# Patient Record
Sex: MALE | Race: WHITE | NOT HISPANIC OR LATINO | Employment: OTHER | URBAN - METROPOLITAN AREA
[De-identification: names, ages, dates, MRNs, and addresses within clinical notes are randomized per-mention and may not be internally consistent; named-entity substitution may affect disease eponyms.]

---

## 2023-03-07 PROBLEM — I10 ESSENTIAL HYPERTENSION: Status: ACTIVE | Noted: 2023-03-07

## 2023-03-07 PROBLEM — B34.9 VIRAL ILLNESS: Status: ACTIVE | Noted: 2023-03-07

## 2023-03-07 PROBLEM — I48.0 PAROXYSMAL ATRIAL FIBRILLATION (MULTI): Status: ACTIVE | Noted: 2023-03-07

## 2023-03-07 RX ORDER — METOPROLOL TARTRATE 25 MG/1
25 TABLET, FILM COATED ORAL 2 TIMES DAILY
COMMUNITY

## 2023-03-07 RX ORDER — MULTIVITAMIN
1 TABLET ORAL DAILY
COMMUNITY

## 2023-03-07 RX ORDER — FLECAINIDE ACETATE 50 MG/1
1.5 TABLET ORAL 2 TIMES DAILY
COMMUNITY

## 2023-03-08 ENCOUNTER — OFFICE VISIT (OUTPATIENT)
Dept: PRIMARY CARE | Facility: CLINIC | Age: 68
End: 2023-03-08
Payer: MEDICARE

## 2023-03-08 VITALS
HEIGHT: 70 IN | HEART RATE: 60 BPM | DIASTOLIC BLOOD PRESSURE: 84 MMHG | WEIGHT: 213.6 LBS | BODY MASS INDEX: 30.58 KG/M2 | TEMPERATURE: 97.8 F | SYSTOLIC BLOOD PRESSURE: 128 MMHG

## 2023-03-08 DIAGNOSIS — R05.1 ACUTE COUGH: ICD-10-CM

## 2023-03-08 DIAGNOSIS — J20.9 ACUTE BRONCHITIS, UNSPECIFIED ORGANISM: Primary | ICD-10-CM

## 2023-03-08 PROCEDURE — 1160F RVW MEDS BY RX/DR IN RCRD: CPT | Performed by: NURSE PRACTITIONER

## 2023-03-08 PROCEDURE — 1159F MED LIST DOCD IN RCRD: CPT | Performed by: NURSE PRACTITIONER

## 2023-03-08 PROCEDURE — 99212 OFFICE O/P EST SF 10 MIN: CPT | Performed by: NURSE PRACTITIONER

## 2023-03-08 PROCEDURE — 3074F SYST BP LT 130 MM HG: CPT | Performed by: NURSE PRACTITIONER

## 2023-03-08 PROCEDURE — 3079F DIAST BP 80-89 MM HG: CPT | Performed by: NURSE PRACTITIONER

## 2023-03-08 PROCEDURE — 1036F TOBACCO NON-USER: CPT | Performed by: NURSE PRACTITIONER

## 2023-03-08 RX ORDER — BETAMETHASONE DIPROPIONATE 0.5 MG/G
CREAM TOPICAL
COMMUNITY
Start: 2022-07-21

## 2023-03-08 RX ORDER — EPINEPHRINE 0.3 MG/.3ML
INJECTION SUBCUTANEOUS
COMMUNITY
Start: 2022-07-21

## 2023-03-08 RX ORDER — LORATADINE 10 MG/1
1 TABLET ORAL DAILY
COMMUNITY
Start: 2022-07-21

## 2023-03-08 RX ORDER — AZITHROMYCIN 250 MG/1
250 TABLET, FILM COATED ORAL DAILY
Qty: 5 TABLET | Refills: 0 | Status: SHIPPED | OUTPATIENT
Start: 2023-03-08 | End: 2023-03-13

## 2023-03-08 RX ORDER — ALBUTEROL SULFATE 90 UG/1
2 AEROSOL, METERED RESPIRATORY (INHALATION) EVERY 4 HOURS PRN
Qty: 18 G | Refills: 0 | Status: SHIPPED | OUTPATIENT
Start: 2023-03-08 | End: 2024-02-19 | Stop reason: ALTCHOICE

## 2023-03-08 RX ORDER — BENZONATATE 100 MG/1
100 CAPSULE ORAL 3 TIMES DAILY PRN
Qty: 20 CAPSULE | Refills: 0 | Status: SHIPPED | OUTPATIENT
Start: 2023-03-08 | End: 2023-03-15

## 2023-03-08 RX ORDER — BENZONATATE 200 MG/1
1 CAPSULE ORAL
COMMUNITY
Start: 2023-01-17 | End: 2023-03-08 | Stop reason: SDUPTHER

## 2023-03-08 RX ORDER — BENZONATATE 200 MG/1
200 CAPSULE ORAL 3 TIMES DAILY PRN
Qty: 20 CAPSULE | Refills: 0 | Status: SHIPPED | OUTPATIENT
Start: 2023-03-08 | End: 2023-03-15

## 2023-03-08 ASSESSMENT — ENCOUNTER SYMPTOMS
DIARRHEA: 0
CHILLS: 0
FEVER: 0
WHEEZING: 0
SORE THROAT: 0
STRIDOR: 0
CHEST TIGHTNESS: 0
SINUS PAIN: 0
ABDOMINAL PAIN: 0
PALPITATIONS: 0
COUGH: 1
CONSTIPATION: 0
VOMITING: 0
SINUS PRESSURE: 0
SHORTNESS OF BREATH: 1
RHINORRHEA: 0

## 2023-03-08 NOTE — PROGRESS NOTES
"Subjective   Patient ID: Herman Merino is a 68 y.o. male who presents for Cough (1 week).    HPI   68 year old male presents with persistent cough (occasional productive) for 1 week.   He has not taken a COVID test.  Patient reports that the cough is severe when it starts and can cause right sided chest and back pain.  He reports that during the coughing fits he also experiences some shortness of breath.  He denies fever, chills, nausea, vomiting, chest pain    Review of Systems   Constitutional:  Negative for chills and fever.   HENT:  Negative for postnasal drip, rhinorrhea, sinus pressure, sinus pain, sneezing and sore throat.    Respiratory:  Positive for cough and shortness of breath. Negative for chest tightness, wheezing and stridor.    Cardiovascular:  Negative for chest pain and palpitations.   Gastrointestinal:  Negative for abdominal pain, constipation, diarrhea and vomiting.       Objective   /84   Pulse 60   Temp 36.6 °C (97.8 °F) (Temporal)   Ht 1.778 m (5' 10\")   Wt 96.9 kg (213 lb 9.6 oz)   BMI 30.65 kg/m²   BSA Body surface area is 2.19 meters squared.      Physical Exam  Constitutional:       Appearance: Normal appearance.   Cardiovascular:      Rate and Rhythm: Normal rate and regular rhythm.   Pulmonary:      Effort: Pulmonary effort is normal. No respiratory distress.      Breath sounds: Rhonchi present.      Comments: Rhonchi noted in bilateral lower lobes  Abdominal:      General: Abdomen is flat.      Palpations: Abdomen is soft.   Neurological:      Mental Status: He is alert.       No results found for any previous visit.     Current Outpatient Medications on File Prior to Visit   Medication Sig Dispense Refill    apixaban (Eliquis) 5 mg tablet Take 1 tablet (5 mg) by mouth in the morning and 1 tablet (5 mg) before bedtime.      benzonatate (Tessalon) 200 mg capsule Take 1 capsule (200 mg) by mouth every 6 hours during the day.      betamethasone dipropionate 0.05 % cream " APPLY TOPICALLY TO AFFECTED AREAS twice a day for 30 DAYS      EPINEPHrine 0.3 mg/0.3 mL injection syringe use as directed by prescriber intramuscularly if needed ONCE for 1 day      flecainide (Tambocor) 50 mg tablet Take 1.5 tablets (75 mg) by mouth in the morning and 1.5 tablets (75 mg) before bedtime.      loratadine (Claritin) 10 mg tablet Take 1 tablet (10 mg) by mouth once daily.      metoprolol tartrate (Lopressor) 25 mg tablet Take 1 tablet (25 mg) by mouth in the morning and 1 tablet (25 mg) before bedtime.      multivitamin tablet Take 1 tablet by mouth once daily.       No current facility-administered medications on file prior to visit.     No images are attached to the encounter.            Assessment/Plan   Problem List Items Addressed This Visit          Respiratory    Acute bronchitis     - Antibiotic sent to pharmacy  - Albuterol inhaler sent to pharmacy   - Advised patient to push fluids   - patient to call if develops new or worsening symptoms              Relevant Medications    azithromycin (Zithromax) 250 mg tablet    albuterol 90 mcg/actuation inhaler    benzonatate (Tessalon) 100 mg capsule       Other    Acute cough - Primary     Patient presents with persistent cough x1 week and right sided chest pain radiating to his back  Positive crackles on examination  -Patient to obtain chest x-ray         Relevant Medications    benzonatate (Tessalon) 200 mg capsule    azithromycin (Zithromax) 250 mg tablet    albuterol 90 mcg/actuation inhaler    benzonatate (Tessalon) 100 mg capsule    Other Relevant Orders    XR chest 2 views

## 2023-03-08 NOTE — ASSESSMENT & PLAN NOTE
- Antibiotic sent to pharmacy  - Albuterol inhaler sent to pharmacy   - Advised patient to push fluids   - patient to call if develops new or worsening symptoms

## 2023-03-08 NOTE — PATIENT INSTRUCTIONS
Obtain chest x-ray as ordered  Start using azithromycin as prescribed  May use albuterol inhaler as needed  May use Tessalon Perles for cough as needed  Please call if your symptoms worsen or do not improve

## 2023-03-08 NOTE — ASSESSMENT & PLAN NOTE
Patient presents with persistent cough x1 week and right sided chest pain radiating to his back  Positive crackles on examination  -Patient to obtain chest x-ray

## 2023-05-22 ENCOUNTER — TELEPHONE (OUTPATIENT)
Dept: PRIMARY CARE | Facility: CLINIC | Age: 68
End: 2023-05-22
Payer: MEDICARE

## 2023-05-23 DIAGNOSIS — I10 ESSENTIAL HYPERTENSION: ICD-10-CM

## 2023-05-23 DIAGNOSIS — Z12.5 SCREENING PSA (PROSTATE SPECIFIC ANTIGEN): ICD-10-CM

## 2023-05-23 DIAGNOSIS — R73.9 ELEVATED SERUM GLUCOSE: ICD-10-CM

## 2023-06-19 DIAGNOSIS — B35.6 JOCK ITCH: ICD-10-CM

## 2023-06-19 RX ORDER — KETOCONAZOLE 20 MG/G
CREAM TOPICAL DAILY
COMMUNITY
End: 2023-06-19 | Stop reason: SDUPTHER

## 2023-06-19 RX ORDER — KETOCONAZOLE 20 MG/G
CREAM TOPICAL DAILY
Qty: 45 G | Refills: 1 | Status: SHIPPED | OUTPATIENT
Start: 2023-06-19 | End: 2024-05-21 | Stop reason: ALTCHOICE

## 2023-09-07 ENCOUNTER — OFFICE VISIT (OUTPATIENT)
Dept: PRIMARY CARE | Facility: CLINIC | Age: 68
End: 2023-09-07
Payer: MEDICARE

## 2023-09-07 VITALS
HEIGHT: 70 IN | TEMPERATURE: 97.4 F | HEART RATE: 58 BPM | OXYGEN SATURATION: 98 % | BODY MASS INDEX: 30.21 KG/M2 | WEIGHT: 211 LBS | SYSTOLIC BLOOD PRESSURE: 126 MMHG | DIASTOLIC BLOOD PRESSURE: 86 MMHG

## 2023-09-07 DIAGNOSIS — Z00.00 ENCOUNTER FOR ANNUAL WELLNESS VISIT (AWV) IN MEDICARE PATIENT: Primary | ICD-10-CM

## 2023-09-07 DIAGNOSIS — Z71.89 ADVANCE DIRECTIVE DISCUSSED WITH PATIENT: ICD-10-CM

## 2023-09-07 DIAGNOSIS — M25.572 LEFT ANKLE PAIN, UNSPECIFIED CHRONICITY: ICD-10-CM

## 2023-09-07 DIAGNOSIS — Z13.89 ENCOUNTER FOR SCREENING FOR OTHER DISORDER: ICD-10-CM

## 2023-09-07 DIAGNOSIS — Z00.00 ROUTINE GENERAL MEDICAL EXAMINATION AT HEALTH CARE FACILITY: ICD-10-CM

## 2023-09-07 DIAGNOSIS — K43.9 ABDOMINAL WALL HERNIA: ICD-10-CM

## 2023-09-07 DIAGNOSIS — I48.0 PAROXYSMAL ATRIAL FIBRILLATION (MULTI): ICD-10-CM

## 2023-09-07 DIAGNOSIS — Z12.11 SCREENING FOR COLON CANCER: ICD-10-CM

## 2023-09-07 DIAGNOSIS — I10 ESSENTIAL HYPERTENSION: ICD-10-CM

## 2023-09-07 DIAGNOSIS — Z28.21 VACCINATION NOT CARRIED OUT BECAUSE OF PATIENT REFUSAL: ICD-10-CM

## 2023-09-07 DIAGNOSIS — Z71.89 CARDIAC RISK COUNSELING: ICD-10-CM

## 2023-09-07 PROBLEM — R05.1 ACUTE COUGH: Status: RESOLVED | Noted: 2023-03-08 | Resolved: 2023-09-07

## 2023-09-07 PROBLEM — J20.9 ACUTE BRONCHITIS: Status: RESOLVED | Noted: 2023-03-08 | Resolved: 2023-09-07

## 2023-09-07 PROCEDURE — 3079F DIAST BP 80-89 MM HG: CPT | Performed by: FAMILY MEDICINE

## 2023-09-07 PROCEDURE — 99497 ADVNCD CARE PLAN 30 MIN: CPT | Performed by: FAMILY MEDICINE

## 2023-09-07 PROCEDURE — 99214 OFFICE O/P EST MOD 30 MIN: CPT | Performed by: FAMILY MEDICINE

## 2023-09-07 PROCEDURE — 3074F SYST BP LT 130 MM HG: CPT | Performed by: FAMILY MEDICINE

## 2023-09-07 PROCEDURE — 1170F FXNL STATUS ASSESSED: CPT | Performed by: FAMILY MEDICINE

## 2023-09-07 PROCEDURE — G0444 DEPRESSION SCREEN ANNUAL: HCPCS | Performed by: FAMILY MEDICINE

## 2023-09-07 PROCEDURE — 1036F TOBACCO NON-USER: CPT | Performed by: FAMILY MEDICINE

## 2023-09-07 PROCEDURE — 1160F RVW MEDS BY RX/DR IN RCRD: CPT | Performed by: FAMILY MEDICINE

## 2023-09-07 PROCEDURE — 99397 PER PM REEVAL EST PAT 65+ YR: CPT | Performed by: FAMILY MEDICINE

## 2023-09-07 PROCEDURE — G0439 PPPS, SUBSEQ VISIT: HCPCS | Performed by: FAMILY MEDICINE

## 2023-09-07 PROCEDURE — 1159F MED LIST DOCD IN RCRD: CPT | Performed by: FAMILY MEDICINE

## 2023-09-07 PROCEDURE — G0446 INTENS BEHAVE THER CARDIO DX: HCPCS | Performed by: FAMILY MEDICINE

## 2023-09-07 ASSESSMENT — ACTIVITIES OF DAILY LIVING (ADL)
GROCERY_SHOPPING: INDEPENDENT
MANAGING_FINANCES: INDEPENDENT
DRESSING: INDEPENDENT
TAKING_MEDICATION: INDEPENDENT
DOING_HOUSEWORK: INDEPENDENT
BATHING: INDEPENDENT

## 2023-09-07 ASSESSMENT — ENCOUNTER SYMPTOMS
FLANK PAIN: 0
CONSTIPATION: 0
APPETITE CHANGE: 0
BACK PAIN: 0
GASTROINTESTINAL NEGATIVE: 1
CARDIOVASCULAR NEGATIVE: 1
SORE THROAT: 0
DECREASED CONCENTRATION: 0
LOSS OF SENSATION IN FEET: 0
LIGHT-HEADEDNESS: 0
PALPITATIONS: 0
FACIAL ASYMMETRY: 0
SEIZURES: 0
ADENOPATHY: 0
DIAPHORESIS: 0
HEADACHES: 0
EYE ITCHING: 0
CHILLS: 0
EYE REDNESS: 0
SINUS PRESSURE: 0
SINUS PAIN: 0
NERVOUS/ANXIOUS: 0
WOUND: 0
DIZZINESS: 0
UNEXPECTED WEIGHT CHANGE: 0
TROUBLE SWALLOWING: 0
DIARRHEA: 0
FREQUENCY: 0
DEPRESSION: 0
ABDOMINAL DISTENTION: 0
VOICE CHANGE: 0
COUGH: 0
DYSURIA: 0
NAUSEA: 0
SPEECH DIFFICULTY: 0
FATIGUE: 0
CHEST TIGHTNESS: 0
DIFFICULTY URINATING: 0
SLEEP DISTURBANCE: 0
VOMITING: 0
ABDOMINAL PAIN: 0
CHOKING: 0
EYE DISCHARGE: 0
POLYDIPSIA: 0
AGITATION: 0
ANAL BLEEDING: 0
APNEA: 0
WHEEZING: 0
EYE PAIN: 0
NUMBNESS: 0
BLOOD IN STOOL: 0
MYALGIAS: 0
STRIDOR: 0
ARTHRALGIAS: 1
OCCASIONAL FEELINGS OF UNSTEADINESS: 0
BRUISES/BLEEDS EASILY: 0

## 2023-09-07 ASSESSMENT — PATIENT HEALTH QUESTIONNAIRE - PHQ9
SUM OF ALL RESPONSES TO PHQ9 QUESTIONS 1 AND 2: 0
10. IF YOU CHECKED OFF ANY PROBLEMS, HOW DIFFICULT HAVE THESE PROBLEMS MADE IT FOR YOU TO DO YOUR WORK, TAKE CARE OF THINGS AT HOME, OR GET ALONG WITH OTHER PEOPLE: NOT DIFFICULT AT ALL
1. LITTLE INTEREST OR PLEASURE IN DOING THINGS: NOT AT ALL
2. FEELING DOWN, DEPRESSED OR HOPELESS: NOT AT ALL

## 2023-09-07 NOTE — PROGRESS NOTES
Advance Care Planning Note     Discussion Date: 09/07/23   Discussion Participants: patient    The patient wishes to discuss Advance Care Planning today and the following is a brief summary of our discussion.     Patient has capacity to make their own medical decisions: Yes  Health Care Agent/Surrogate Decision Maker documented in chart: No    Documents on file and valid:  Advance Directive/Living Will: Yes   Health Care Power of : Yes  Other: no DNR. Full code status.    Communication of Medical Status/Prognosis:   good     Communication of Treatment Goals/Options:   good     Treatment Decisions  good    Time Statement: Total face to face time spent on advance care planning was 16 minutes with 16 minutes spent in counseling, including the explanation.    Usama Girard, DOS[employer]   Patient ID: Herman Merino is a 68 y.o. male who presents for Medicare Annual Wellness Visit Subsequent.    Saw cardiology and dermatologist.  Overall is been feeling well.    Patient's had some left ankle pain and discomfort.  He has been following up with orthopedic specialist going to do physical therapy for this.  He has noted some asymmetry of the abdominal wall there is been no nausea vomiting no pain no discomfort.  He is also due to have his colon cancer screening performed and is agreeing to doing so.    Continue to follow-up with cardiology regarding atrial fibrillation.  This has been stable.  From time to time he states he is somewhat tired when he has the episodes of the A-fib but he does not really feel it he said no chest pain or shortness of breath he said no nausea no vomiting no abdominal pain or discomfort he has had no blood in the stool or black tarry stool no numbness no tingling legs or feet he does see dermatology regularly but is had some tenderness over the lateral aspect of the left ankle.    Saw derm.    Saw cadio    Saw ortho.    Intrmittant A fib.      Pain into the L foot and ankle      "    Alcohol intake: none  Caffeine intake: 2 cups of coffee  Exercise: not much with foot pain    Last Colonoscopy: ordered  Last Pap smear: N/A  Mammogram:N/A  Last Dexa scan:N/A    Shingles vaccine: ordered  TdaP vaccine:     Review of Systems   Constitutional:  Negative for appetite change, chills, diaphoresis, fatigue and unexpected weight change.   HENT:  Negative for congestion, dental problem, ear discharge, ear pain, hearing loss, mouth sores, nosebleeds, postnasal drip, sinus pressure, sinus pain, sore throat, tinnitus, trouble swallowing and voice change.    Eyes:  Negative for pain, discharge, redness, itching and visual disturbance.   Respiratory:  Negative for apnea, cough, choking, chest tightness, wheezing and stridor.    Cardiovascular: Negative.  Negative for chest pain, palpitations and leg swelling.   Gastrointestinal: Negative.  Negative for abdominal distention, abdominal pain, anal bleeding, blood in stool, constipation, diarrhea, nausea and vomiting.   Endocrine: Negative for cold intolerance, heat intolerance, polydipsia and polyuria.   Genitourinary:  Positive for enuresis (1 time). Negative for difficulty urinating, dysuria, flank pain and frequency.   Musculoskeletal:  Positive for arthralgias. Negative for back pain and myalgias.        Ankle pain   Skin:  Negative for rash and wound.   Allergic/Immunologic: Negative for environmental allergies, food allergies and immunocompromised state.   Neurological:  Negative for dizziness, seizures, facial asymmetry, speech difficulty, light-headedness, numbness and headaches.   Hematological:  Negative for adenopathy. Does not bruise/bleed easily.   Psychiatric/Behavioral:  Negative for agitation, behavioral problems, decreased concentration, sleep disturbance and suicidal ideas. The patient is not nervous/anxious.        Objective   /86   Pulse 58   Temp 36.3 °C (97.4 °F)   Ht 1.765 m (5' 9.5\")   Wt 95.7 kg (211 lb)   SpO2 98%   BMI " 30.71 kg/m²   BSA Body surface area is 2.17 meters squared.      Physical Exam  Constitutional:       General: He is not in acute distress.     Appearance: Normal appearance. He is not ill-appearing or toxic-appearing.   HENT:      Head: Normocephalic.      Right Ear: Tympanic membrane normal.      Left Ear: Tympanic membrane normal.      Nose: Nose normal.   Eyes:      Extraocular Movements: Extraocular movements intact.      Conjunctiva/sclera: Conjunctivae normal.      Pupils: Pupils are equal, round, and reactive to light.   Cardiovascular:      Rate and Rhythm: Normal rate and regular rhythm.      Pulses: Normal pulses.      Heart sounds: Normal heart sounds.   Pulmonary:      Effort: Pulmonary effort is normal.      Breath sounds: Normal breath sounds. No wheezing or rhonchi.   Abdominal:      General: Abdomen is flat. Bowel sounds are normal. There is no distension.      Palpations: Abdomen is soft.      Tenderness: There is no abdominal tenderness. There is no right CVA tenderness or rebound.      Hernia: No hernia is present.      Comments: Abdominal wall hernia noted.  Easily reducible.  Noted with standing in the left upper abdominal wall area   Genitourinary:     Penis: Normal.    Musculoskeletal:         General: Normal range of motion.      Cervical back: Normal range of motion.      Right lower leg: No edema.      Comments: Slight tenderness noted lateral aspect of the left ankle.  Anterior posterior drawer sign intact.    Dorsiflexion extension intact.  No redness no warmth   Skin:     General: Skin is warm and dry.      Capillary Refill: Capillary refill takes less than 2 seconds.      Findings: No bruising.   Neurological:      General: No focal deficit present.      Mental Status: He is alert and oriented to person, place, and time.      Cranial Nerves: No cranial nerve deficit.      Sensory: No sensory deficit.      Motor: No weakness.      Coordination: Coordination normal.      Gait: Gait  normal.      Deep Tendon Reflexes: Reflexes normal.   Psychiatric:         Mood and Affect: Mood normal.         Behavior: Behavior normal.       No results found for any previous visit.     Current Outpatient Medications on File Prior to Visit   Medication Sig Dispense Refill    apixaban (Eliquis) 5 mg tablet Take 1 tablet (5 mg) by mouth 2 times a day.      betamethasone dipropionate 0.05 % cream APPLY TOPICALLY TO AFFECTED AREAS twice a day for 30 DAYS      EPINEPHrine 0.3 mg/0.3 mL injection syringe use as directed by prescriber intramuscularly if needed ONCE for 1 day      flecainide (Tambocor) 50 mg tablet Take 1.5 tablets (75 mg) by mouth 2 times a day.      ketoconazole (NIZOral) 2 % cream Apply topically once daily. 45 g 1    loratadine (Claritin) 10 mg tablet Take 1 tablet (10 mg) by mouth once daily.      metoprolol tartrate (Lopressor) 25 mg tablet Take 1 tablet (25 mg) by mouth 2 times a day.      multivitamin tablet Take 1 tablet by mouth once daily.      albuterol 90 mcg/actuation inhaler Inhale 2 puffs every 4 hours if needed for wheezing or shortness of breath. 18 g 0     No current facility-administered medications on file prior to visit.     No images are attached to the encounter.            Assessment/Plan   Problem List Items Addressed This Visit       Essential hypertension     Blood pressure under good control at this time         Paroxysmal atrial fibrillation (CMS/HCC)     Please continue with anticoagulation and with Tambocor therapy please continue to follow-up with cardiology         Vaccination not carried out because of patient refusal     I have recommended doing Prevnar 20 information given today.  I recommended doing shingles vaccination information given today.         Encounter for annual wellness visit (AWV) in Medicare patient - Primary    Abdominal wall hernia     Would like for you to see the surgeon for evaluation of abdominal wall hernia.  If any pain or discomfort in this  area noted please let me know          Other Visit Diagnoses       Routine general medical examination at health care facility        Left ankle pain, unspecified chronicity        Encounter for screening for other disorder [Z13.89]        Advance directive discussed with patient [Z71.89]        Cardiac risk counseling [Z71.89]

## 2023-09-07 NOTE — PATIENT INSTRUCTIONS
Please have your lab studies performed would like to evaluate all aspects of your health with this.    You are due to have your colon cancer screening performed going to recommend GI specialist to do this recommend seeing Dr. Hazel or partner.    Recommended shingles vaccination and Prevnar 20.    Going to have you see surgery specialist because of abdominal wall hernia.  If any nausea vomiting or abdominal pain noted please call and let me know.    Please continue to follow-up with orthopedics regarding left ankle pain

## 2023-09-07 NOTE — ASSESSMENT & PLAN NOTE
Would like for you to see the surgeon for evaluation of abdominal wall hernia.  If any pain or discomfort in this area noted please let me know

## 2023-09-07 NOTE — ASSESSMENT & PLAN NOTE
Please continue with anticoagulation and with Tambocor therapy please continue to follow-up with cardiology

## 2023-09-07 NOTE — ASSESSMENT & PLAN NOTE
I have recommended doing Prevnar 20 information given today.  I recommended doing shingles vaccination information given today.

## 2023-09-20 ENCOUNTER — LAB (OUTPATIENT)
Dept: LAB | Facility: LAB | Age: 68
End: 2023-09-20
Payer: MEDICARE

## 2023-09-20 DIAGNOSIS — R73.9 ELEVATED SERUM GLUCOSE: ICD-10-CM

## 2023-09-20 DIAGNOSIS — I10 ESSENTIAL HYPERTENSION: ICD-10-CM

## 2023-09-20 DIAGNOSIS — Z12.5 SCREENING PSA (PROSTATE SPECIFIC ANTIGEN): ICD-10-CM

## 2023-09-20 LAB
ALANINE AMINOTRANSFERASE (SGPT) (U/L) IN SER/PLAS: 35 U/L (ref 10–52)
ALBUMIN (G/DL) IN SER/PLAS: 4.4 G/DL (ref 3.4–5)
ALBUMIN (G/DL) IN SER/PLAS: 4.4 G/DL (ref 3.4–5)
ALKALINE PHOSPHATASE (U/L) IN SER/PLAS: 57 U/L (ref 33–136)
ANION GAP IN SER/PLAS: 12 MMOL/L (ref 10–20)
ANION GAP IN SER/PLAS: 17 MMOL/L (ref 10–20)
ASPARTATE AMINOTRANSFERASE (SGOT) (U/L) IN SER/PLAS: 23 U/L (ref 9–39)
BASOPHILS (10*3/UL) IN BLOOD BY AUTOMATED COUNT: 0.06 X10E9/L (ref 0–0.1)
BASOPHILS/100 LEUKOCYTES IN BLOOD BY AUTOMATED COUNT: 0.6 % (ref 0–2)
BILIRUBIN TOTAL (MG/DL) IN SER/PLAS: 0.8 MG/DL (ref 0–1.2)
CALCIUM (MG/DL) IN SER/PLAS: 10.3 MG/DL (ref 8.6–10.6)
CALCIUM (MG/DL) IN SER/PLAS: 10.4 MG/DL (ref 8.6–10.6)
CARBON DIOXIDE, TOTAL (MMOL/L) IN SER/PLAS: 27 MMOL/L (ref 21–32)
CARBON DIOXIDE, TOTAL (MMOL/L) IN SER/PLAS: 29 MMOL/L (ref 21–32)
CHLORIDE (MMOL/L) IN SER/PLAS: 102 MMOL/L (ref 98–107)
CHLORIDE (MMOL/L) IN SER/PLAS: 99 MMOL/L (ref 98–107)
CHOLESTEROL (MG/DL) IN SER/PLAS: 164 MG/DL (ref 0–199)
CHOLESTEROL IN HDL (MG/DL) IN SER/PLAS: 42.1 MG/DL
CHOLESTEROL/HDL RATIO: 3.9
CREATININE (MG/DL) IN SER/PLAS: 1.17 MG/DL (ref 0.5–1.3)
CREATININE (MG/DL) IN SER/PLAS: 1.17 MG/DL (ref 0.5–1.3)
EOSINOPHILS (10*3/UL) IN BLOOD BY AUTOMATED COUNT: 0.5 X10E9/L (ref 0–0.7)
EOSINOPHILS/100 LEUKOCYTES IN BLOOD BY AUTOMATED COUNT: 4.9 % (ref 0–6)
ERYTHROCYTE DISTRIBUTION WIDTH (RATIO) BY AUTOMATED COUNT: 13.2 % (ref 11.5–14.5)
ERYTHROCYTE DISTRIBUTION WIDTH (RATIO) BY AUTOMATED COUNT: 13.3 % (ref 11.5–14.5)
ERYTHROCYTE MEAN CORPUSCULAR HEMOGLOBIN CONCENTRATION (G/DL) BY AUTOMATED: 32.3 G/DL (ref 32–36)
ERYTHROCYTE MEAN CORPUSCULAR HEMOGLOBIN CONCENTRATION (G/DL) BY AUTOMATED: 32.7 G/DL (ref 32–36)
ERYTHROCYTE MEAN CORPUSCULAR VOLUME (FL) BY AUTOMATED COUNT: 89 FL (ref 80–100)
ERYTHROCYTE MEAN CORPUSCULAR VOLUME (FL) BY AUTOMATED COUNT: 91 FL (ref 80–100)
ERYTHROCYTES (10*6/UL) IN BLOOD BY AUTOMATED COUNT: 5.28 X10E12/L (ref 4.5–5.9)
ERYTHROCYTES (10*6/UL) IN BLOOD BY AUTOMATED COUNT: 5.34 X10E12/L (ref 4.5–5.9)
ESTIMATED AVERAGE GLUCOSE FOR HBA1C: 111 MG/DL
GFR MALE: 69 ML/MIN/1.73M2
GFR MALE: 69 ML/MIN/1.73M2
GLUCOSE (MG/DL) IN SER/PLAS: 93 MG/DL (ref 74–99)
GLUCOSE (MG/DL) IN SER/PLAS: 98 MG/DL (ref 74–99)
HEMATOCRIT (%) IN BLOOD BY AUTOMATED COUNT: 47.7 % (ref 41–52)
HEMATOCRIT (%) IN BLOOD BY AUTOMATED COUNT: 48.3 % (ref 41–52)
HEMOGLOBIN (G/DL) IN BLOOD: 15.6 G/DL (ref 13.5–17.5)
HEMOGLOBIN (G/DL) IN BLOOD: 15.6 G/DL (ref 13.5–17.5)
HEMOGLOBIN A1C/HEMOGLOBIN TOTAL IN BLOOD: 5.5 %
IMMATURE GRANULOCYTES/100 LEUKOCYTES IN BLOOD BY AUTOMATED COUNT: 0.2 % (ref 0–0.9)
LDL: 86 MG/DL (ref 0–99)
LEUKOCYTES (10*3/UL) IN BLOOD BY AUTOMATED COUNT: 10 X10E9/L (ref 4.4–11.3)
LEUKOCYTES (10*3/UL) IN BLOOD BY AUTOMATED COUNT: 10.1 X10E9/L (ref 4.4–11.3)
LYMPHOCYTES (10*3/UL) IN BLOOD BY AUTOMATED COUNT: 2.67 X10E9/L (ref 1.2–4.8)
LYMPHOCYTES/100 LEUKOCYTES IN BLOOD BY AUTOMATED COUNT: 26.4 % (ref 13–44)
MONOCYTES (10*3/UL) IN BLOOD BY AUTOMATED COUNT: 0.98 X10E9/L (ref 0.1–1)
MONOCYTES/100 LEUKOCYTES IN BLOOD BY AUTOMATED COUNT: 9.7 % (ref 2–10)
NEUTROPHILS (10*3/UL) IN BLOOD BY AUTOMATED COUNT: 5.9 X10E9/L (ref 1.2–7.7)
NEUTROPHILS/100 LEUKOCYTES IN BLOOD BY AUTOMATED COUNT: 58.2 % (ref 40–80)
NRBC (PER 100 WBCS) BY AUTOMATED COUNT: 0 /100 WBC (ref 0–0)
NRBC (PER 100 WBCS) BY AUTOMATED COUNT: 0 /100 WBC (ref 0–0)
PHOSPHATE (MG/DL) IN SER/PLAS: 3.5 MG/DL (ref 2.5–4.9)
PLATELETS (10*3/UL) IN BLOOD AUTOMATED COUNT: 283 X10E9/L (ref 150–450)
PLATELETS (10*3/UL) IN BLOOD AUTOMATED COUNT: 295 X10E9/L (ref 150–450)
POTASSIUM (MMOL/L) IN SER/PLAS: 4.8 MMOL/L (ref 3.5–5.3)
POTASSIUM (MMOL/L) IN SER/PLAS: 4.8 MMOL/L (ref 3.5–5.3)
PROSTATE SPECIFIC ANTIGEN,SCREEN: 0.61 NG/ML (ref 0–4)
PROTEIN TOTAL: 7.6 G/DL (ref 6.4–8.2)
SODIUM (MMOL/L) IN SER/PLAS: 138 MMOL/L (ref 136–145)
SODIUM (MMOL/L) IN SER/PLAS: 138 MMOL/L (ref 136–145)
THYROTROPIN (MIU/L) IN SER/PLAS BY DETECTION LIMIT <= 0.05 MIU/L: 1.52 MIU/L (ref 0.44–3.98)
TRIGLYCERIDE (MG/DL) IN SER/PLAS: 178 MG/DL (ref 0–149)
UREA NITROGEN (MG/DL) IN SER/PLAS: 16 MG/DL (ref 6–23)
UREA NITROGEN (MG/DL) IN SER/PLAS: 17 MG/DL (ref 6–23)
VLDL: 36 MG/DL (ref 0–40)

## 2023-09-20 PROCEDURE — G0103 PSA SCREENING: HCPCS

## 2023-09-20 PROCEDURE — 80053 COMPREHEN METABOLIC PANEL: CPT

## 2023-09-20 PROCEDURE — 80061 LIPID PANEL: CPT

## 2023-09-20 PROCEDURE — 84443 ASSAY THYROID STIM HORMONE: CPT

## 2023-09-20 PROCEDURE — 83036 HEMOGLOBIN GLYCOSYLATED A1C: CPT

## 2023-09-20 PROCEDURE — 36415 COLL VENOUS BLD VENIPUNCTURE: CPT

## 2023-09-20 PROCEDURE — 85025 COMPLETE CBC W/AUTO DIFF WBC: CPT

## 2023-10-03 ENCOUNTER — PREP FOR PROCEDURE (OUTPATIENT)
Dept: SURGERY | Facility: HOSPITAL | Age: 68
End: 2023-10-03

## 2023-10-03 ENCOUNTER — OFFICE VISIT (OUTPATIENT)
Dept: SURGERY | Facility: CLINIC | Age: 68
End: 2023-10-03
Payer: MEDICARE

## 2023-10-03 ENCOUNTER — APPOINTMENT (OUTPATIENT)
Dept: SURGERY | Facility: CLINIC | Age: 68
End: 2023-10-03
Payer: MEDICARE

## 2023-10-03 VITALS
WEIGHT: 215 LBS | BODY MASS INDEX: 31.29 KG/M2 | DIASTOLIC BLOOD PRESSURE: 77 MMHG | HEART RATE: 66 BPM | SYSTOLIC BLOOD PRESSURE: 119 MMHG

## 2023-10-03 DIAGNOSIS — K43.2 INCISIONAL HERNIA, WITHOUT OBSTRUCTION OR GANGRENE: Primary | ICD-10-CM

## 2023-10-03 DIAGNOSIS — Z01.818 PREOP TESTING: ICD-10-CM

## 2023-10-03 PROCEDURE — 1159F MED LIST DOCD IN RCRD: CPT | Performed by: SURGERY

## 2023-10-03 PROCEDURE — 3074F SYST BP LT 130 MM HG: CPT | Performed by: SURGERY

## 2023-10-03 PROCEDURE — 1160F RVW MEDS BY RX/DR IN RCRD: CPT | Performed by: SURGERY

## 2023-10-03 PROCEDURE — 99215 OFFICE O/P EST HI 40 MIN: CPT | Performed by: SURGERY

## 2023-10-03 PROCEDURE — 1036F TOBACCO NON-USER: CPT | Performed by: SURGERY

## 2023-10-03 PROCEDURE — 3078F DIAST BP <80 MM HG: CPT | Performed by: SURGERY

## 2023-10-03 NOTE — H&P
History Of Present Illness  eHrman Merino is a 68 y.o. male who presents for follow-up with regard to his incisional hernia following CAT scan imaging.  The patient was seen initially by me on 9/19/2023 at my [facility] office.  Please see that initial office visit in  legacy [employer].  At that time, the patient was diagnosed with likely an incisional hernia from his previous laparoscopic operation.  The patient underwent a robotic-assisted, laparoscopic repair of bilateral inguinal hernias in [facility] 3 to 5 years ago.  On examination at that time, the patient was noted to have a bulge in the epigastrium adjacent to his laparoscopic scar.    CT imaging was performed of the abdomen and pelvis with IV contrast on 9/25/2023.  I personally reviewed the report and the images.  This reveals evidence of a supraumbilical midline fat-containing hernia with edema and stranding of the herniated fat.  Specifically, there is a 2 cm transverse by 1.4 cm sagittal fascial defect with a hernia sac measuring 4 x 2.4 x 3.8 containing fat.  Incidental findings include fatty infiltration of the liver colonic diverticulosis and BPH.    Of note, the patient does have paroxysmal atrial fibrillation and is on Eliquis 5 mg p.o. twice daily.  He follows with Dr. Aric Livingston from cardiology.  He also has a history of hepatitis C virus as well in addition to hypertension.    Patient lives in [place].  He is self-employed and does industrial equipment repair.    Past Medical History  He has no past medical history on file.    Surgical History  He has a past surgical history that includes Skin cancer excision and Hernia repair.     Social History  He reports that he has quit smoking. He has never been exposed to tobacco smoke. He has never used smokeless tobacco. He reports that he does not drink alcohol and does not use drugs.    Family History  Family History   Problem Relation Name Age of Onset    Diabetes Mother      Other  (cardiac disorder) Father          Allergies  Bee venom protein (honey bee)    Review of Systems  Review of Systems    General: Not Present- Obesity, Cancer, HIV, MRSA, Recent Cold/Flu, Tired During the Day and VRE.  HEENT: Not Present- Migraine, Cataracts, Glaucoma, Macular Degeneration and Retinal Detachment.  Respiratory:Not Present- Asthma, Chronic Cough, Difficulty Breathing on Exertion, Difficulty Breathing at Rest, Emphysema, Frequent Bronchitis, Home CPAP/BiPAP, Home Oxygen, Pulmonary Embolus, Pneumonia/TB, Sleep Apnea and Snoring.  Cardiovascular: Not Present- Chest Pain, Congestive Heart Failure, Heart Attack, Coronary Artery Disease, Heart Stent, High Cholesterol/Lipids, Internal Defibrillator, Mitral Valve Prolapse, Murmur, Pacemaker and Peripheral Vascular Disease.  Gastrointestinal: Not Present- Heartburn, Hepatitis, Hiatal Hernia, Jaundice, Reflux, Stomach Ulcer and IBS.  Male Genitourinary: Not Present- Enlarged Prostate, Kidney Failure, Kidney Stones, Dialysis and Urinary Tract Infection.  Musculoskeletal: Not Present- Arthritis, Back Pain and Fibromyalgia.  Neurological: Not Present- Headaches, Numbness, Tingling, Seizures, Stroke,  Shunt and Weakness.  Psychiatric: Not Present- Anxiety, Bipolar, Depression and Panic Attacks.  Endocrine: Not Present- Diabetes, Hyperthyroidism, Hypothyroidism and Low Blood Sugar.  Hematology: Not Present- Abnormal Bleeding, Anemia and Blood Clots.     Physical Exam  General Complete Physical Exam    The physical exam findings are as follows:    General Appearance - Cooperative and Well groomed. Not in acute distress. Build & Nutrition - Well nourished.  Posture - Normal posture. Gait - Normal. Hydration - Well hydrated.    Integumentary  General Characteristics: Overall examination of the patient's skin reveals - no rashes, no suspicious lesions, no bruises and no evidence of scars. Color - normal coloration of skin. Skin Moisture - normal skin moisture.  Temperature - normal  warmth is noted. Texture - normal skin texture.    Head and Neck  Head - normocephalic, atraumatic with no lesions or palpable masses.  Neck  Global Assessment - full range of motion. no bruit auscultated on the right, no bruit auscultated on the left, non-tender, no lymphadenopathy, no palpable mass on the right and no palpable mass on the left.  Trachea - midline.  Thyroid Gland Characteristics - no palpable nodules, normal position, symmetric and smooth.    Eyes  Sclera/Conjunctiva - Bilateral - Normal. Pupil - Bilateral - Accommodating, Direct reaction to light normal and Equal.    ENMT  Global Assessment  Upon examination of the ears, nose, mouth and throat - examination of the oral cavity reveals normal lips, teeth, gums and oral mucosa and hard and soft palates, tongue, tonsils and posterior pharynx are moist and symmetric without lesions.    Chest and Lung Exam  Inspection: Shape - Symmetric. Movements - Symmetrical. Accessory muscles - No use of accessory muscles in breathing.  Percussion:  Quality and Intensity: - Percussion normal.  Palpation: - Palpation normal.  Auscultation:  Breath sounds: - Normal and equal bilaterally.  Adventitious sounds: - none bilaterally.    Cardiovascular  Inspection: Carotid artery - Bilateral - Inspection Normal.  Palpation/Percussion: Examination by palpation and percussion reveals - No Thrills.  Cardiac Borders - Normal.  Auscultation: Rhythm - Regular. Rate: Regular.  Heart Sounds - Normal heart sounds, S1 WNL and S2 WNL.  Murmurs & Other Heart Sounds: Auscultation of the heart reveals - No Murmurs or other extra heart sounds.    Abdomen  Inspection: Inspection of the abdomen reveals -there is a 2 cm sagittal scar, 5 cm superior to the umbilicus in the midline; just superior and left lateral to the scar, there is a 4 x 4 centimeter diffuse bulging when standing, and this is less apparent when supine; a clear fascial defect cannot be palpated on  examination  Palpation/Percussion: Palpation and Percussion of the abdomen reveal - Non Tender and No Palpable abdominal  masses.  Liver: - Normal.  Spleen: - Normal.  Auscultation: Auscultation of the abdomen reveals - Bowel sounds normal.  Surgical scars: none    Inguinal  No inguinal or femoral hernias or lymphadenopathy bilaterally.    Rectal - Did not examine.    Peripheral Vascular  Lower Extremity: Inspection - Bilateral - No Varicose veins.  Palpation: Edema - Bilateral - No edema.    Musculoskeletal  Global Assessment  Right Upper Extremity - no deformities, masses or tenderness, no known fractures, normal strength and tone and  normal range of motion without pain. Left Upper Extremity - no deformities, masses or tenderness, no known fractures,  normal strength and tone and normal range of motion without pain. Right Lower Extremity - no deformities, masses or  tenderness, no known fractures, normal strength and tone and normal range of motion without pain. Left Lower  Extremity - no deformities, masses or tenderness, no known fractures, normal strength and tone and normal range of  motion without pain.    Lymphatic  Head & Neck  General Head & Neck Lymphatics:  Bilateral: Description - Normal.  Axillary  General Axillary Region:  Bilateral: Description - Normal.  Femoral & Inguinal  Generalized Femoral & Inguinal Lymphatics:  Bilateral: Description - Normal.     Last Recorded Vitals  Blood pressure 119/77, pulse 66, weight 97.5 kg (215 lb).    Relevant Results    Home Meds:  Current Outpatient Medications   Medication Instructions    albuterol 90 mcg/actuation inhaler 2 puffs, inhalation, Every 4 hours PRN    apixaban (ELIQUIS) 5 mg, oral, 2 times daily    betamethasone dipropionate 0.05 % cream APPLY TOPICALLY TO AFFECTED AREAS twice a day for 30 DAYS    EPINEPHrine 0.3 mg/0.3 mL injection syringe use as directed by prescriber intramuscularly if needed ONCE for 1 day    flecainide (Tambocor) 50 mg tablet  1.5 tablets, oral, 2 times daily    ketoconazole (NIZOral) 2 % cream Topical, Daily    loratadine (Claritin) 10 mg tablet 1 tablet, oral, Daily    metoprolol tartrate (LOPRESSOR) 25 mg, oral, 2 times daily    multivitamin tablet 1 tablet, oral, Daily        Recent Lab Results:  No results found for this or any previous visit (from the past 24 hour(s)).    Recent Imaging Results:  CT abdomen pelvis w IV contrast    Result Date: 9/25/2023  Interpreted By:  GRETA CROSS MD MRN: [id number] Patient Name: LETA DE SANTIAGO  STUDY: CT ABDOMEN AND PELVIS W IV CONTRAST;  9/25/2023 1:46 pm  INDICATION: Rule out incisional hernia epigastrium  K43.2: Incisional hernia.  COMPARISON: None available.  ACCESSION NUMBER(S): [id number]  ORDERING CLINICIAN: TERELL MACK  TECHNIQUE: Contiguous axial images were obtained through the abdomen and pelvis after the administration of  75 mL Omnipaque 350 intravenous contrast. Coronal and sagittal reformations were made.  FINDINGS: LOWER CHEST: There is respiratory motion artifact with mild bibasilar atelectasis/scarring.  ABDOMEN:  LIVER: There is diffuse fatty infiltration of the liver.  BILE DUCTS: Nondilated.  GALLBLADDER: The gallbladder is not distended and without calcified stones.  PANCREAS: There is pancreatic parenchymal atrophy with insinuating fat throughout the gland.  SPLEEN: Within normal limits.  ADRENAL GLANDS: Within normal limits.  KIDNEYS AND URETERS: The kidneys enhance symmetrically without focal lesion.  No hydroureteronephrosis bilaterally.  Urinary bladder is partially distended. Prostate is enlarged containing irregular dystrophic calcifications.  VESSELS: Irregular atherosclerotic calcifications present in the aorta and branch vessels. No aortic aneurysm. IVC is unremarkable.  BOWEL: No bowel obstruction. Appendix is normal.  Few scattered small colonic diverticula present without acute diverticulitis.  PERITONEUM/RETROPERITONEUM/LYMPH NODES: No ascites or free air,  no fluid collection.  No retroperitoneal fluid collection or lymphadenopathy.  ABDOMINAL WALL: There is a supraumbilical midline mildly lobulated fat containing ventral hernia which measures approximately 4 x 2.4 x 3.8 cm in transverse, AP, and CC dimensions respectively. The hernia neck measures 2 x 1.4 cm in transverse and CC dimensions respectively. There is mild edema/fat stranding of the herniated fat. No bowel extends into this hernia sac.  BONE AND SOFT TISSUE: Mild multilevel disc space narrowing and predominantly anterior endplate spurring is present in the spine. Lower lumbar facet arthrosis is present. There is some joint space narrowing and marginal spurring of both hips.      Supraumbilical midline mildly lobulated fat containing hernia with mild edema/fat stranding of the herniated fat.  Diffuse fatty infiltration of the liver.  Normal appendix. No bowel obstruction.  Mild colonic diverticulosis without acute diverticulitis.  Enlarged prostate.  MACRO: None.      Assessment/Plan   Impression:  Mr. Merino has a reducible symptomatic incisional hernia.    Recommendation:    In order to relieve symptoms, and prevent complications such as incarceration, repair is indicated and recommended.    We will proceed with this in the near future, likely Thursday, 10/26/2023, under general anesthesia as an outpatient at [facility].  This will be a laparoscopic repair, possible open, with mesh (CPT 53596).    Preoperative CBC and renal profile were satisfactory.  EKG will be obtained preoperatively.    The patient will require preoperative cardiac clearance from Dr. Aric Livingston his cardiologist.  He will also need to stop his Eliquis 2 days preoperatively.    Patient will see me for a postoperative visit at my [facility] office on Tuesday, 11/7/2023.    Incisional/Ventral/Umbilical Hernia Consent:  The procedure was explained to the patient in detail, including the risks, benefits and alternatives. The risks include, but  are not limited to, infection, bleeding, hematoma, seroma, need for further surgery, poor wound healing, exposure or infection of the mesh, need for removal of the mesh, fistula, recurrence of the hernia, postoperative bowel obstruction, and need for further operation. The patient agreed with plan and signed consent.         Ramone Engel MD

## 2023-10-03 NOTE — H&P (VIEW-ONLY)
History Of Present Illness  Herman Merino is a 68 y.o. male who presents for follow-up with regard to his incisional hernia following CAT scan imaging.  The patient was seen initially by me on 9/19/2023 at my [facility] office.  Please see that initial office visit in  legacy [employer].  At that time, the patient was diagnosed with likely an incisional hernia from his previous laparoscopic operation.  The patient underwent a robotic-assisted, laparoscopic repair of bilateral inguinal hernias in [facility] 3 to 5 years ago.  On examination at that time, the patient was noted to have a bulge in the epigastrium adjacent to his laparoscopic scar.    CT imaging was performed of the abdomen and pelvis with IV contrast on 9/25/2023.  I personally reviewed the report and the images.  This reveals evidence of a supraumbilical midline fat-containing hernia with edema and stranding of the herniated fat.  Specifically, there is a 2 cm transverse by 1.4 cm sagittal fascial defect with a hernia sac measuring 4 x 2.4 x 3.8 containing fat.  Incidental findings include fatty infiltration of the liver colonic diverticulosis and BPH.    Of note, the patient does have paroxysmal atrial fibrillation and is on Eliquis 5 mg p.o. twice daily.  He follows with Dr. Aric Livingston from cardiology.  He also has a history of hepatitis C virus as well in addition to hypertension.    Patient lives in [place].  He is self-employed and does industrial equipment repair.    Past Medical History  He has no past medical history on file.    Surgical History  He has a past surgical history that includes Skin cancer excision and Hernia repair.     Social History  He reports that he has quit smoking. He has never been exposed to tobacco smoke. He has never used smokeless tobacco. He reports that he does not drink alcohol and does not use drugs.    Family History  Family History   Problem Relation Name Age of Onset    Diabetes Mother      Other  (cardiac disorder) Father          Allergies  Bee venom protein (honey bee)    Review of Systems  Review of Systems    General: Not Present- Obesity, Cancer, HIV, MRSA, Recent Cold/Flu, Tired During the Day and VRE.  HEENT: Not Present- Migraine, Cataracts, Glaucoma, Macular Degeneration and Retinal Detachment.  Respiratory:Not Present- Asthma, Chronic Cough, Difficulty Breathing on Exertion, Difficulty Breathing at Rest, Emphysema, Frequent Bronchitis, Home CPAP/BiPAP, Home Oxygen, Pulmonary Embolus, Pneumonia/TB, Sleep Apnea and Snoring.  Cardiovascular: Not Present- Chest Pain, Congestive Heart Failure, Heart Attack, Coronary Artery Disease, Heart Stent, High Cholesterol/Lipids, Internal Defibrillator, Mitral Valve Prolapse, Murmur, Pacemaker and Peripheral Vascular Disease.  Gastrointestinal: Not Present- Heartburn, Hepatitis, Hiatal Hernia, Jaundice, Reflux, Stomach Ulcer and IBS.  Male Genitourinary: Not Present- Enlarged Prostate, Kidney Failure, Kidney Stones, Dialysis and Urinary Tract Infection.  Musculoskeletal: Not Present- Arthritis, Back Pain and Fibromyalgia.  Neurological: Not Present- Headaches, Numbness, Tingling, Seizures, Stroke,  Shunt and Weakness.  Psychiatric: Not Present- Anxiety, Bipolar, Depression and Panic Attacks.  Endocrine: Not Present- Diabetes, Hyperthyroidism, Hypothyroidism and Low Blood Sugar.  Hematology: Not Present- Abnormal Bleeding, Anemia and Blood Clots.     Physical Exam  General Complete Physical Exam    The physical exam findings are as follows:    General Appearance - Cooperative and Well groomed. Not in acute distress. Build & Nutrition - Well nourished.  Posture - Normal posture. Gait - Normal. Hydration - Well hydrated.    Integumentary  General Characteristics: Overall examination of the patient's skin reveals - no rashes, no suspicious lesions, no bruises and no evidence of scars. Color - normal coloration of skin. Skin Moisture - normal skin moisture.  Temperature - normal  warmth is noted. Texture - normal skin texture.    Head and Neck  Head - normocephalic, atraumatic with no lesions or palpable masses.  Neck  Global Assessment - full range of motion. no bruit auscultated on the right, no bruit auscultated on the left, non-tender, no lymphadenopathy, no palpable mass on the right and no palpable mass on the left.  Trachea - midline.  Thyroid Gland Characteristics - no palpable nodules, normal position, symmetric and smooth.    Eyes  Sclera/Conjunctiva - Bilateral - Normal. Pupil - Bilateral - Accommodating, Direct reaction to light normal and Equal.    ENMT  Global Assessment  Upon examination of the ears, nose, mouth and throat - examination of the oral cavity reveals normal lips, teeth, gums and oral mucosa and hard and soft palates, tongue, tonsils and posterior pharynx are moist and symmetric without lesions.    Chest and Lung Exam  Inspection: Shape - Symmetric. Movements - Symmetrical. Accessory muscles - No use of accessory muscles in breathing.  Percussion:  Quality and Intensity: - Percussion normal.  Palpation: - Palpation normal.  Auscultation:  Breath sounds: - Normal and equal bilaterally.  Adventitious sounds: - none bilaterally.    Cardiovascular  Inspection: Carotid artery - Bilateral - Inspection Normal.  Palpation/Percussion: Examination by palpation and percussion reveals - No Thrills.  Cardiac Borders - Normal.  Auscultation: Rhythm - Regular. Rate: Regular.  Heart Sounds - Normal heart sounds, S1 WNL and S2 WNL.  Murmurs & Other Heart Sounds: Auscultation of the heart reveals - No Murmurs or other extra heart sounds.    Abdomen  Inspection: Inspection of the abdomen reveals -there is a 2 cm sagittal scar, 5 cm superior to the umbilicus in the midline; just superior and left lateral to the scar, there is a 4 x 4 centimeter diffuse bulging when standing, and this is less apparent when supine; a clear fascial defect cannot be palpated on  examination  Palpation/Percussion: Palpation and Percussion of the abdomen reveal - Non Tender and No Palpable abdominal  masses.  Liver: - Normal.  Spleen: - Normal.  Auscultation: Auscultation of the abdomen reveals - Bowel sounds normal.  Surgical scars: none    Inguinal  No inguinal or femoral hernias or lymphadenopathy bilaterally.    Rectal - Did not examine.    Peripheral Vascular  Lower Extremity: Inspection - Bilateral - No Varicose veins.  Palpation: Edema - Bilateral - No edema.    Musculoskeletal  Global Assessment  Right Upper Extremity - no deformities, masses or tenderness, no known fractures, normal strength and tone and  normal range of motion without pain. Left Upper Extremity - no deformities, masses or tenderness, no known fractures,  normal strength and tone and normal range of motion without pain. Right Lower Extremity - no deformities, masses or  tenderness, no known fractures, normal strength and tone and normal range of motion without pain. Left Lower  Extremity - no deformities, masses or tenderness, no known fractures, normal strength and tone and normal range of  motion without pain.    Lymphatic  Head & Neck  General Head & Neck Lymphatics:  Bilateral: Description - Normal.  Axillary  General Axillary Region:  Bilateral: Description - Normal.  Femoral & Inguinal  Generalized Femoral & Inguinal Lymphatics:  Bilateral: Description - Normal.     Last Recorded Vitals  Blood pressure 119/77, pulse 66, weight 97.5 kg (215 lb).    Relevant Results    Home Meds:  Current Outpatient Medications   Medication Instructions    albuterol 90 mcg/actuation inhaler 2 puffs, inhalation, Every 4 hours PRN    apixaban (ELIQUIS) 5 mg, oral, 2 times daily    betamethasone dipropionate 0.05 % cream APPLY TOPICALLY TO AFFECTED AREAS twice a day for 30 DAYS    EPINEPHrine 0.3 mg/0.3 mL injection syringe use as directed by prescriber intramuscularly if needed ONCE for 1 day    flecainide (Tambocor) 50 mg tablet  1.5 tablets, oral, 2 times daily    ketoconazole (NIZOral) 2 % cream Topical, Daily    loratadine (Claritin) 10 mg tablet 1 tablet, oral, Daily    metoprolol tartrate (LOPRESSOR) 25 mg, oral, 2 times daily    multivitamin tablet 1 tablet, oral, Daily        Recent Lab Results:  No results found for this or any previous visit (from the past 24 hour(s)).    Recent Imaging Results:  CT abdomen pelvis w IV contrast    Result Date: 9/25/2023  Interpreted By:  GRETA CROSS MD MRN: [id number] Patient Name: LETA DE SANTIAGO  STUDY: CT ABDOMEN AND PELVIS W IV CONTRAST;  9/25/2023 1:46 pm  INDICATION: Rule out incisional hernia epigastrium  K43.2: Incisional hernia.  COMPARISON: None available.  ACCESSION NUMBER(S): [id number]  ORDERING CLINICIAN: TERELL MACK  TECHNIQUE: Contiguous axial images were obtained through the abdomen and pelvis after the administration of  75 mL Omnipaque 350 intravenous contrast. Coronal and sagittal reformations were made.  FINDINGS: LOWER CHEST: There is respiratory motion artifact with mild bibasilar atelectasis/scarring.  ABDOMEN:  LIVER: There is diffuse fatty infiltration of the liver.  BILE DUCTS: Nondilated.  GALLBLADDER: The gallbladder is not distended and without calcified stones.  PANCREAS: There is pancreatic parenchymal atrophy with insinuating fat throughout the gland.  SPLEEN: Within normal limits.  ADRENAL GLANDS: Within normal limits.  KIDNEYS AND URETERS: The kidneys enhance symmetrically without focal lesion.  No hydroureteronephrosis bilaterally.  Urinary bladder is partially distended. Prostate is enlarged containing irregular dystrophic calcifications.  VESSELS: Irregular atherosclerotic calcifications present in the aorta and branch vessels. No aortic aneurysm. IVC is unremarkable.  BOWEL: No bowel obstruction. Appendix is normal.  Few scattered small colonic diverticula present without acute diverticulitis.  PERITONEUM/RETROPERITONEUM/LYMPH NODES: No ascites or free air,  no fluid collection.  No retroperitoneal fluid collection or lymphadenopathy.  ABDOMINAL WALL: There is a supraumbilical midline mildly lobulated fat containing ventral hernia which measures approximately 4 x 2.4 x 3.8 cm in transverse, AP, and CC dimensions respectively. The hernia neck measures 2 x 1.4 cm in transverse and CC dimensions respectively. There is mild edema/fat stranding of the herniated fat. No bowel extends into this hernia sac.  BONE AND SOFT TISSUE: Mild multilevel disc space narrowing and predominantly anterior endplate spurring is present in the spine. Lower lumbar facet arthrosis is present. There is some joint space narrowing and marginal spurring of both hips.      Supraumbilical midline mildly lobulated fat containing hernia with mild edema/fat stranding of the herniated fat.  Diffuse fatty infiltration of the liver.  Normal appendix. No bowel obstruction.  Mild colonic diverticulosis without acute diverticulitis.  Enlarged prostate.  MACRO: None.      Assessment/Plan   Impression:  Mr. Merino has a reducible symptomatic incisional hernia.    Recommendation:    In order to relieve symptoms, and prevent complications such as incarceration, repair is indicated and recommended.    We will proceed with this in the near future, likely Thursday, 10/26/2023, under general anesthesia as an outpatient at [facility].  This will be a laparoscopic repair, possible open, with mesh (CPT 43121).    Preoperative CBC and renal profile were satisfactory.  EKG will be obtained preoperatively.    The patient will require preoperative cardiac clearance from Dr. Aric Livingston his cardiologist.  He will also need to stop his Eliquis 2 days preoperatively.    Patient will see me for a postoperative visit at my [facility] office on Tuesday, 11/7/2023.    Incisional/Ventral/Umbilical Hernia Consent:  The procedure was explained to the patient in detail, including the risks, benefits and alternatives. The risks include, but  are not limited to, infection, bleeding, hematoma, seroma, need for further surgery, poor wound healing, exposure or infection of the mesh, need for removal of the mesh, fistula, recurrence of the hernia, postoperative bowel obstruction, and need for further operation. The patient agreed with plan and signed consent.         Ramone Engel MD

## 2023-10-05 RX ORDER — SODIUM CHLORIDE, SODIUM LACTATE, POTASSIUM CHLORIDE, CALCIUM CHLORIDE 600; 310; 30; 20 MG/100ML; MG/100ML; MG/100ML; MG/100ML
100 INJECTION, SOLUTION INTRAVENOUS CONTINUOUS
Status: CANCELLED | OUTPATIENT
Start: 2023-10-05

## 2023-10-10 ENCOUNTER — APPOINTMENT (OUTPATIENT)
Dept: PHYSICAL THERAPY | Facility: CLINIC | Age: 68
End: 2023-10-10
Payer: MEDICARE

## 2023-10-11 ENCOUNTER — DOCUMENTATION (OUTPATIENT)
Dept: PREADMISSION TESTING | Facility: HOSPITAL | Age: 68
End: 2023-10-11
Payer: MEDICARE

## 2023-10-17 ENCOUNTER — TELEPHONE (OUTPATIENT)
Dept: SURGERY | Facility: CLINIC | Age: 68
End: 2023-10-17
Payer: MEDICARE

## 2023-10-17 ENCOUNTER — TELEPHONE (OUTPATIENT)
Dept: SURGERY | Facility: HOSPITAL | Age: 68
End: 2023-10-17
Payer: MEDICARE

## 2023-10-17 NOTE — TELEPHONE ENCOUNTER
Spouse called to confirm when patient was to hold his Eliquis, and the address of the hospital.  I relayed to the patient that he is to hold the Eliquis two days prior- and wife responded with he held it starting today.  I also gave the address of the hospital and answered any last minute questions they had.  They verbalized understanding and all questions answered before ending the call.

## 2023-10-17 NOTE — TELEPHONE ENCOUNTER
Wife called and left a message stating that she hadn't yet received any information with addresses for the procedure, or any pre admission instructions.  I sent via [facility] a letter with the address, and those requested instructions this morning.  I left a message with patients wife that I did send a notice to his [facility] with the requested information and gave my direct phone number for return call back.

## 2023-10-18 ENCOUNTER — PREP FOR PROCEDURE (OUTPATIENT)
Dept: SURGERY | Facility: CLINIC | Age: 68
End: 2023-10-18
Payer: MEDICARE

## 2023-10-18 NOTE — PREPROCEDURE INSTRUCTIONS
Current Medications   Medication Instructions    Eliquis Hold 2 days prior to surgery    betamethasone dipropionate 0.05 % cream Continue until night before surgery    flecainide (Tambocor) 50 mg tablet Take morning of surgery with sip of water, no other fluids    metoprolol tartrate (Lopressor) 25 mg tablet Take morning of surgery with sip of water, no other fluids    multivitamin tablet Stop 7 days before surgery                       NPO Instructions:  Nothing to eat or drink from midnight before surgery except to take Flecanide and Metoprolol in am with small sip of water.      Additional Instructions: Wear loose comfortable clothing, nothing on the skin after showering before surgery. Remove  jewelry or  anything metal.  Need a designated  home.

## 2023-10-19 ENCOUNTER — HOSPITAL ENCOUNTER (OUTPATIENT)
Facility: HOSPITAL | Age: 68
Setting detail: OUTPATIENT SURGERY
Discharge: HOME | End: 2023-10-19
Attending: SURGERY | Admitting: SURGERY
Payer: MEDICARE

## 2023-10-19 ENCOUNTER — ANESTHESIA EVENT (OUTPATIENT)
Dept: OPERATING ROOM | Facility: HOSPITAL | Age: 68
End: 2023-10-19
Payer: MEDICARE

## 2023-10-19 ENCOUNTER — ANESTHESIA (OUTPATIENT)
Dept: OPERATING ROOM | Facility: HOSPITAL | Age: 68
End: 2023-10-19
Payer: MEDICARE

## 2023-10-19 VITALS
TEMPERATURE: 97.3 F | OXYGEN SATURATION: 100 % | RESPIRATION RATE: 16 BRPM | DIASTOLIC BLOOD PRESSURE: 68 MMHG | SYSTOLIC BLOOD PRESSURE: 122 MMHG | HEART RATE: 68 BPM

## 2023-10-19 DIAGNOSIS — K43.9 ABDOMINAL WALL HERNIA: Primary | ICD-10-CM

## 2023-10-19 DIAGNOSIS — K43.2 INCISIONAL HERNIA, WITHOUT OBSTRUCTION OR GANGRENE: ICD-10-CM

## 2023-10-19 PROCEDURE — C1781 MESH (IMPLANTABLE): HCPCS | Performed by: SURGERY

## 2023-10-19 PROCEDURE — 2500000004 HC RX 250 GENERAL PHARMACY W/ HCPCS (ALT 636 FOR OP/ED)

## 2023-10-19 PROCEDURE — A4217 STERILE WATER/SALINE, 500 ML: HCPCS | Mod: AU | Performed by: SURGERY

## 2023-10-19 PROCEDURE — 88302 TISSUE EXAM BY PATHOLOGIST: CPT | Mod: TC,SUR | Performed by: SURGERY

## 2023-10-19 PROCEDURE — 88302 TISSUE EXAM BY PATHOLOGIST: CPT | Performed by: PATHOLOGY

## 2023-10-19 PROCEDURE — 88302 TISSUE EXAM BY PATHOLOGIST: CPT | Mod: TC | Performed by: SURGERY

## 2023-10-19 PROCEDURE — 3700000002 HC GENERAL ANESTHESIA TIME - EACH INCREMENTAL 1 MINUTE: Performed by: SURGERY

## 2023-10-19 PROCEDURE — 93005 ELECTROCARDIOGRAM TRACING: CPT | Mod: 59

## 2023-10-19 PROCEDURE — A49595 PR RPR AA HERNIA 1ST > 10 CM REDUCIBLE: Performed by: ANESTHESIOLOGY

## 2023-10-19 PROCEDURE — 2720000007 HC OR 272 NO HCPCS: Performed by: SURGERY

## 2023-10-19 PROCEDURE — 49595 RPR AA HRN 1ST > 10 RDC: CPT | Performed by: SURGERY

## 2023-10-19 PROCEDURE — 2500000005 HC RX 250 GENERAL PHARMACY W/O HCPCS: Performed by: SURGERY

## 2023-10-19 PROCEDURE — 7100000010 HC PHASE TWO TIME - EACH INCREMENTAL 1 MINUTE: Performed by: SURGERY

## 2023-10-19 PROCEDURE — 2780000003 HC OR 278 NO HCPCS: Performed by: SURGERY

## 2023-10-19 PROCEDURE — 2500000001 HC RX 250 WO HCPCS SELF ADMINISTERED DRUGS (ALT 637 FOR MEDICARE OP): Performed by: ANESTHESIOLOGY

## 2023-10-19 PROCEDURE — 7100000001 HC RECOVERY ROOM TIME - INITIAL BASE CHARGE: Performed by: SURGERY

## 2023-10-19 PROCEDURE — 3600000009 HC OR TIME - EACH INCREMENTAL 1 MINUTE - PROCEDURE LEVEL FOUR: Performed by: SURGERY

## 2023-10-19 PROCEDURE — 7100000002 HC RECOVERY ROOM TIME - EACH INCREMENTAL 1 MINUTE: Performed by: SURGERY

## 2023-10-19 PROCEDURE — 3600000004 HC OR TIME - INITIAL BASE CHARGE - PROCEDURE LEVEL FOUR: Performed by: SURGERY

## 2023-10-19 PROCEDURE — A49595 PR RPR AA HERNIA 1ST > 10 CM REDUCIBLE: Performed by: NURSE ANESTHETIST, CERTIFIED REGISTERED

## 2023-10-19 PROCEDURE — 7100000009 HC PHASE TWO TIME - INITIAL BASE CHARGE: Performed by: SURGERY

## 2023-10-19 PROCEDURE — 3700000001 HC GENERAL ANESTHESIA TIME - INITIAL BASE CHARGE: Performed by: SURGERY

## 2023-10-19 PROCEDURE — 2500000004 HC RX 250 GENERAL PHARMACY W/ HCPCS (ALT 636 FOR OP/ED): Mod: AU | Performed by: SURGERY

## 2023-10-19 PROCEDURE — 2500000004 HC RX 250 GENERAL PHARMACY W/ HCPCS (ALT 636 FOR OP/ED): Performed by: ANESTHESIOLOGY

## 2023-10-19 PROCEDURE — 2500000004 HC RX 250 GENERAL PHARMACY W/ HCPCS (ALT 636 FOR OP/ED): Performed by: NURSE ANESTHETIST, CERTIFIED REGISTERED

## 2023-10-19 PROCEDURE — 2500000005 HC RX 250 GENERAL PHARMACY W/O HCPCS: Performed by: NURSE ANESTHETIST, CERTIFIED REGISTERED

## 2023-10-19 DEVICE — VENTRALIGHT™ ST MESH WITH ECHO 2™ POSITIONING SYSTEM15CM X 20CM / 6“ X  8“ ELLIPSE
Type: IMPLANTABLE DEVICE | Site: ABDOMEN | Status: FUNCTIONAL
Brand: VENTRALIGHT ST MESH WITH ECHO 2 POSITIONING SYSTEM

## 2023-10-19 RX ORDER — MEPERIDINE HYDROCHLORIDE 25 MG/ML
12.5 INJECTION INTRAMUSCULAR; INTRAVENOUS; SUBCUTANEOUS EVERY 10 MIN PRN
Status: DISCONTINUED | OUTPATIENT
Start: 2023-10-19 | End: 2023-10-19 | Stop reason: HOSPADM

## 2023-10-19 RX ORDER — SODIUM CHLORIDE, SODIUM LACTATE, POTASSIUM CHLORIDE, CALCIUM CHLORIDE 600; 310; 30; 20 MG/100ML; MG/100ML; MG/100ML; MG/100ML
100 INJECTION, SOLUTION INTRAVENOUS CONTINUOUS
Status: DISCONTINUED | OUTPATIENT
Start: 2023-10-19 | End: 2023-10-19 | Stop reason: HOSPADM

## 2023-10-19 RX ORDER — LIDOCAINE HYDROCHLORIDE 10 MG/ML
0.1 INJECTION, SOLUTION EPIDURAL; INFILTRATION; INTRACAUDAL; PERINEURAL ONCE
Status: DISCONTINUED | OUTPATIENT
Start: 2023-10-19 | End: 2023-10-19 | Stop reason: HOSPADM

## 2023-10-19 RX ORDER — BUPIVACAINE HCL/EPINEPHRINE 0.5-1:200K
VIAL (ML) INJECTION AS NEEDED
Status: DISCONTINUED | OUTPATIENT
Start: 2023-10-19 | End: 2023-10-19 | Stop reason: HOSPADM

## 2023-10-19 RX ORDER — ONDANSETRON HYDROCHLORIDE 2 MG/ML
INJECTION, SOLUTION INTRAVENOUS AS NEEDED
Status: DISCONTINUED | OUTPATIENT
Start: 2023-10-19 | End: 2023-10-19

## 2023-10-19 RX ORDER — GABAPENTIN 300 MG/1
300 CAPSULE ORAL ONCE
Status: COMPLETED | OUTPATIENT
Start: 2023-10-19 | End: 2023-10-19

## 2023-10-19 RX ORDER — SODIUM CHLORIDE 0.9 G/100ML
IRRIGANT IRRIGATION AS NEEDED
Status: DISCONTINUED | OUTPATIENT
Start: 2023-10-19 | End: 2023-10-19 | Stop reason: HOSPADM

## 2023-10-19 RX ORDER — MIDAZOLAM HYDROCHLORIDE 1 MG/ML
INJECTION, SOLUTION INTRAMUSCULAR; INTRAVENOUS AS NEEDED
Status: DISCONTINUED | OUTPATIENT
Start: 2023-10-19 | End: 2023-10-19

## 2023-10-19 RX ORDER — OXYCODONE HYDROCHLORIDE 5 MG/1
5 TABLET ORAL EVERY 6 HOURS PRN
Qty: 18 TABLET | Refills: 0 | Status: SHIPPED | OUTPATIENT
Start: 2023-10-19 | End: 2023-10-24

## 2023-10-19 RX ORDER — ROCURONIUM BROMIDE 10 MG/ML
INJECTION, SOLUTION INTRAVENOUS AS NEEDED
Status: DISCONTINUED | OUTPATIENT
Start: 2023-10-19 | End: 2023-10-19

## 2023-10-19 RX ORDER — FENTANYL CITRATE 50 UG/ML
INJECTION, SOLUTION INTRAMUSCULAR; INTRAVENOUS AS NEEDED
Status: DISCONTINUED | OUTPATIENT
Start: 2023-10-19 | End: 2023-10-19

## 2023-10-19 RX ORDER — ACETAMINOPHEN 325 MG/1
650 TABLET ORAL ONCE
Status: COMPLETED | OUTPATIENT
Start: 2023-10-19 | End: 2023-10-19

## 2023-10-19 RX ORDER — HYDROMORPHONE HYDROCHLORIDE 1 MG/ML
1 INJECTION, SOLUTION INTRAMUSCULAR; INTRAVENOUS; SUBCUTANEOUS EVERY 5 MIN PRN
Status: DISCONTINUED | OUTPATIENT
Start: 2023-10-19 | End: 2023-10-19 | Stop reason: HOSPADM

## 2023-10-19 RX ORDER — MEPERIDINE HYDROCHLORIDE 25 MG/ML
INJECTION INTRAMUSCULAR; INTRAVENOUS; SUBCUTANEOUS
Status: COMPLETED
Start: 2023-10-19 | End: 2023-10-19

## 2023-10-19 RX ORDER — PROPOFOL 10 MG/ML
INJECTION, EMULSION INTRAVENOUS AS NEEDED
Status: DISCONTINUED | OUTPATIENT
Start: 2023-10-19 | End: 2023-10-19

## 2023-10-19 RX ORDER — LIDOCAINE HYDROCHLORIDE 20 MG/ML
INJECTION, SOLUTION INFILTRATION; PERINEURAL AS NEEDED
Status: DISCONTINUED | OUTPATIENT
Start: 2023-10-19 | End: 2023-10-19

## 2023-10-19 RX ORDER — DEXAMETHASONE SODIUM PHOSPHATE 4 MG/ML
INJECTION, SOLUTION INTRA-ARTICULAR; INTRALESIONAL; INTRAMUSCULAR; INTRAVENOUS; SOFT TISSUE AS NEEDED
Status: DISCONTINUED | OUTPATIENT
Start: 2023-10-19 | End: 2023-10-19

## 2023-10-19 RX ADMIN — FENTANYL CITRATE 50 MCG: 50 INJECTION, SOLUTION INTRAMUSCULAR; INTRAVENOUS at 12:41

## 2023-10-19 RX ADMIN — DEXAMETHASONE SODIUM PHOSPHATE 4 MG: 4 INJECTION, SOLUTION INTRAMUSCULAR; INTRAVENOUS at 12:08

## 2023-10-19 RX ADMIN — SUGAMMADEX 200 MG: 100 INJECTION, SOLUTION INTRAVENOUS at 13:10

## 2023-10-19 RX ADMIN — PROPOFOL 170 MG: 10 INJECTION, EMULSION INTRAVENOUS at 11:56

## 2023-10-19 RX ADMIN — ONDANSETRON 4 MG: 2 INJECTION INTRAMUSCULAR; INTRAVENOUS at 13:00

## 2023-10-19 RX ADMIN — MEPERIDINE HYDROCHLORIDE 12.5 MG: 25 INJECTION INTRAMUSCULAR; INTRAVENOUS; SUBCUTANEOUS at 15:45

## 2023-10-19 RX ADMIN — FENTANYL CITRATE 50 MCG: 50 INJECTION, SOLUTION INTRAMUSCULAR; INTRAVENOUS at 12:37

## 2023-10-19 RX ADMIN — LIDOCAINE HYDROCHLORIDE 100 MG: 20 INJECTION, SOLUTION INFILTRATION; PERINEURAL at 13:11

## 2023-10-19 RX ADMIN — HYDROMORPHONE HYDROCHLORIDE 1 MG: 1 INJECTION, SOLUTION INTRAMUSCULAR; INTRAVENOUS; SUBCUTANEOUS at 14:31

## 2023-10-19 RX ADMIN — SODIUM CHLORIDE 8 MCG: 9 INJECTION, SOLUTION INTRAVENOUS at 13:00

## 2023-10-19 RX ADMIN — SODIUM CHLORIDE, SODIUM LACTATE, POTASSIUM CHLORIDE, AND CALCIUM CHLORIDE: .6; .31; .03; .02 INJECTION, SOLUTION INTRAVENOUS at 11:47

## 2023-10-19 RX ADMIN — FENTANYL CITRATE 50 MCG: 50 INJECTION, SOLUTION INTRAMUSCULAR; INTRAVENOUS at 13:27

## 2023-10-19 RX ADMIN — LIDOCAINE HYDROCHLORIDE 60 MG: 20 INJECTION, SOLUTION INFILTRATION; PERINEURAL at 11:56

## 2023-10-19 RX ADMIN — ROCURONIUM BROMIDE 50 MG: 10 INJECTION, SOLUTION INTRAVENOUS at 11:57

## 2023-10-19 RX ADMIN — GABAPENTIN 300 MG: 300 CAPSULE ORAL at 11:14

## 2023-10-19 RX ADMIN — FENTANYL CITRATE 50 MCG: 50 INJECTION, SOLUTION INTRAMUSCULAR; INTRAVENOUS at 13:22

## 2023-10-19 RX ADMIN — FENTANYL CITRATE 100 MCG: 50 INJECTION, SOLUTION INTRAMUSCULAR; INTRAVENOUS at 11:56

## 2023-10-19 RX ADMIN — ACETAMINOPHEN 650 MG: 325 TABLET ORAL at 11:14

## 2023-10-19 RX ADMIN — MIDAZOLAM 2 MG: 1 INJECTION INTRAMUSCULAR; INTRAVENOUS at 11:54

## 2023-10-19 SDOH — HEALTH STABILITY: MENTAL HEALTH: CURRENT SMOKER: 0

## 2023-10-19 ASSESSMENT — PAIN SCALES - GENERAL
PAINLEVEL_OUTOF10: 2
PAINLEVEL_OUTOF10: 0 - NO PAIN
PAINLEVEL_OUTOF10: 0 - NO PAIN
PAINLEVEL_OUTOF10: 2
PAINLEVEL_OUTOF10: 8
PAINLEVEL_OUTOF10: 0 - NO PAIN
PAINLEVEL_OUTOF10: 0 - NO PAIN
PAINLEVEL_OUTOF10: 3
PAINLEVEL_OUTOF10: 4
PAINLEVEL_OUTOF10: 3
PAINLEVEL_OUTOF10: 3
PAINLEVEL_OUTOF10: 0 - NO PAIN
PAINLEVEL_OUTOF10: 3
PAINLEVEL_OUTOF10: 2
PAINLEVEL_OUTOF10: 8
PAINLEVEL_OUTOF10: 2
PAINLEVEL_OUTOF10: 0 - NO PAIN
PAIN_LEVEL: 8

## 2023-10-19 ASSESSMENT — PAIN - FUNCTIONAL ASSESSMENT

## 2023-10-19 ASSESSMENT — COLUMBIA-SUICIDE SEVERITY RATING SCALE - C-SSRS
1. IN THE PAST MONTH, HAVE YOU WISHED YOU WERE DEAD OR WISHED YOU COULD GO TO SLEEP AND NOT WAKE UP?: NO
2. HAVE YOU ACTUALLY HAD ANY THOUGHTS OF KILLING YOURSELF?: NO
6. HAVE YOU EVER DONE ANYTHING, STARTED TO DO ANYTHING, OR PREPARED TO DO ANYTHING TO END YOUR LIFE?: NO

## 2023-10-19 NOTE — ANESTHESIA PREPROCEDURE EVALUATION
Patient: Herman Merino    Procedure Information       Date/Time: 10/19/23 1200    Procedure: LAPAROSCOPIC INCISIONAL HERNIA WITH MESH/ POSSIBLE OPEN    Location: [facility] OR 09 / [facility]    Surgeons: Ramone Engel MD            Relevant Problems   Anesthesia (within normal limits)      Cardiovascular   (+) Essential hypertension   (+) Paroxysmal atrial fibrillation (CMS/HCC)      Infectious Disease   (+) Viral illness       Clinical information reviewed:    Allergies  Meds               NPO Detail:  NPO/Void Status  Date of Last Liquid: 10/18/23  Time of Last Liquid: 1159  Date of Last Solid: 10/18/23  Time of Last Solid: 1159         Physical Exam    Airway  Mallampati: III  TM distance: >3 FB  Neck ROM: full     Cardiovascular - normal exam     Dental - normal exam     Pulmonary - normal exam     Abdominal - normal exam       Other findings: Facial hair          Anesthesia Plan    ASA 2     general     The patient is not a current smoker.  Patient was previously instructed to abstain from smoking on day of procedure.  Patient did not smoke on day of procedure.    intravenous induction   Postoperative administration of opioids is intended.  Anesthetic plan and risks discussed with patient.  Use of blood products discussed with patient who consented to blood products.    Plan discussed with CRNA.

## 2023-10-19 NOTE — ANESTHESIA PROCEDURE NOTES
Airway  Date/Time: 10/19/2023 12:02 PM  Urgency: elective    Airway not difficult    Staffing  Performed: SRNA   Authorized by: Herman Pablo MD    Performed by: BEKAH Weller  Patient location during procedure: OR    Indications and Patient Condition  Indications for airway management: anesthesia and airway protection  Spontaneous Ventilation: absent  Sedation level: deep  Preoxygenated: yes  Patient position: sniffing  Mask difficulty assessment: 3 - difficult mask (inadequate, unstable or two providers) +/- NMBA  Planned trial extubation    Final Airway Details  Final airway type: endotracheal airway      Successful airway: ETT  Cuffed: yes   Successful intubation technique: direct laryngoscopy  Facilitating devices/methods: intubating stylet  Endotracheal tube insertion site: oral  Blade: Darian  Blade size: #4  ETT size (mm): 7.5  Cormack-Lehane Classification: grade IIa - partial view of glottis  Placement verified by: chest auscultation and capnometry   Measured from: gums  ETT to gums (cm): 22  Number of attempts at approach: 1

## 2023-10-19 NOTE — ANESTHESIA POSTPROCEDURE EVALUATION
Patient: Herman Merino    Procedure Summary       Date: 10/19/23 Room / Location: [facility] 09 / [facility]    Anesthesia Start: 1147 Anesthesia Stop:     Procedure: LAPAROSCOPIC INCISIONAL HERNIA WITH MESH/ POSSIBLE OPEN Diagnosis:       Incisional hernia, without obstruction or gangrene      (Incisional hernia, without obstruction or gangrene [K43.2])    Surgeons: Ramone Engel MD Responsible Provider: Herman Pablo MD    Anesthesia Type: general ASA Status: 2            Anesthesia Type: general    Vitals Value Taken Time   /92 10/19/23 1324   Temp 36.2 10/19/23 1324   Pulse 79 10/19/23 1324   Resp 16 10/19/23 1324   SpO2 98 10/19/23 1324       Anesthesia Post Evaluation    Patient location during evaluation: PACU  Patient participation: complete - patient participated  Level of consciousness: awake  Pain score: 8  Pain management: inadequate  Airway patency: patent  Cardiovascular status: acceptable  Respiratory status: acceptable and face mask  Hydration status: acceptable        No notable events documented.

## 2023-10-19 NOTE — OP NOTE
LAPAROSCOPIC INCISIONAL HERNIA WITH MESH/ POSSIBLE OPEN Operative Note     Date: 10/19/2023  OR Location: [facility]    Name: Herman Merino, : 1955, Age: 68 y.o., MRN: [id number], Sex: male    Diagnosis  Pre-op Diagnosis     * Incisional hernia, without obstruction or gangrene [K43.2] Post-op Diagnosis     * Incisional hernia, without obstruction or gangrene [K43.2]     Procedures  Laparoscopic repair of reducible incisional and umbilical hernias, with mesh    Surgeons      * Ramone Engel - Primary    Resident/Fellow/Other Assistant:  Surgeon(s) and Role: Tyrel Baxter SA    Procedure Summary  Anesthesia: General  ASA: II  Anesthesia Staff: Anesthesiologist: Herman Pablo MD  CRNA: BRIGETTE Weller-CRNA  Estimated Blood Loss: Less than 5 mL  Intra-op Medications:   Medication Name Total Dose   sodium chloride 0.9 % irrigation solution 6,000 mL   BUPivacaine-EPINEPHrine (Marcaine w/EPI) 0.5 %-1:200,000 injection 20 mL              Anesthesia Record               Intraprocedure I/O Totals          Intake    Dexmedetomidine 0.00 mL    The total shown is the total volume documented since Anesthesia Start was filed.    .00 mL    Total Intake 900 mL       Output    Urine 200 mL    Est. Blood Loss 5 mL    Total Output 205 mL       Net    Net Volume 695 mL          Specimen:   ID Type Source Tests Collected by Time   1 : hernia sac Tissue HERNIA SAC SURGICAL PATHOLOGY EXAM Ramone Engel MD 10/19/2023 1235        Staff:   Circulator: Ines Esparza RN  Relief Circulator: Lori Esparza RN  Relief Scrub: Ilya Matt  Scrub Person: Sondra Tsang RN         Drains and/or Catheters:   [REMOVED] NG/OG/Feeding Tube (Removed)       [REMOVED] Urethral Catheter 16 Fr. (Removed)       Tourniquet Times:         Implants:  Implants       Type Name Action Serial No.      Implant POSITIONING SYSTEM, ECHO 2, HERNIA REPAIR, 15 X 20CM, ELLIPSE - [id number] Implanted               Findings: The patient had 2  fascial defects; the incisional hernia was in the mid epigastrium adjacent to his previous scar from a laparoscopic inguinal hernia repair; this defect was roughly 2.5 x 2.0 cm and the hernia sac contained preperitoneal fat; the second defect was at the umbilicus and this was roughly 2 cm in diameter and the hernia sac was empty; the distance between the most superior fascial edge of the incisional hernia defect and the most inferior edge of the umbilical hernia defect was 12 cm    Indications: Herman Merino is an 68 y.o. male who is having surgery for Incisional hernia, without obstruction or gangrene [K43.2].     The patient was seen in the preoperative area. The risks, benefits, complications, treatment options, non-operative alternatives, expected recovery and outcomes were discussed with the patient. The possibilities of reaction to medication, pulmonary aspiration, injury to surrounding structures, bleeding, recurrent infection, the need for additional procedures, failure to diagnose a condition, and creating a complication requiring transfusion or operation were discussed with the patient. The patient concurred with the proposed plan, giving informed consent.  The site of surgery was properly noted/marked if necessary per policy. The patient has been actively warmed in preoperative area. Preoperative antibiotics are not indicated. Venous thrombosis prophylaxis have been ordered including bilateral sequential compression devices    Procedure Details:     Findings:  see above    Specimens:  None    Procedure:      The patient was taken to the operating room placed on the table in the supine position. Preoperative huddle was accomplished with the OR team and the patient.  Satisfactory general endotracheal anesthesia was achieved. The abdomen was widely prepared and draped in normal sterile fashion.  After the appropriate timeout, the case commenced.    In the left upper quadrant of the abdomen, at [place], a 5 mm Visiport trocar was placed in the standard fashion without difficulty. Pneumoperitoneum was achieved in the standard fashion. The above findings were noted. Under direct vision, additional 5 mm trochars were placed in the left lower quadrant, right lower quadrant, and right upper quadrant, for a total of 4 trochars. The left upper quadrant trocar was ultimately changed to a 10/12 mm trocar to accommodate mesh insertion.    Surveillance of the abdominal cavity with attention to the abdominal wall was accomplished.  The above findings were noted.  The superior fascial defect in the mid epigastrium which was the incisional hernia, was defined.  The fascial defect was identified at 9.  The prolapsed preperitoneal fat was reduced.  Using a LigaSure device, this prolapsed fat was resected, including surrounding preperitoneal fat  around the fascial defect,  and in continuity with the most inferior aspect of the falciform ligament.  This fatty tissue was then placed in an Endobag, and removed through the 10/12 mm trocar in the left upper quadrant.  This was removed transabdominally from the bag using a ring forceps.  This was sent to pathology and labeled accordingly.  Following this, the preperitoneal fatty tissue around the umbilical hernia defect was resected were necessary, and the sutures removed through the 10/12 mm trocar, and sent in the same specimen.  Following this, the distance between the most superior and inferior fascial edges of the tube defects was measured using spinal needle placed percutaneously. The above findings were noted.    An oblong Ventralight ST Mesh with PS Echo Positioning System was used for the repair by Beisen. This measured 20 x 15 cm, providing a circumferential 4 cm overlap. This was placed intraperitoneally under direct vision in the standard fashion via 10/12 trochar. This was unfolded and oriented properly relative to the defect. The mesh was then brought to the anterior  parietal peritoneum using the central positioning suture percutaneously via a tiny stab wound. After proper positioning of the mesh, the mesh was then under direct vision circumferentially adhered to the abdominal wall using a SecureStrap Absorbable Strap Fixation Device by [employer].  This was the double crown technique.  Following this, the Echo system was removed under direct vision without difficulty through the10/12 mm trocar. Following this, the mesh was inspected and additional tacks were placed where necessary. Following this, there is excellent positioning of the mesh and this was flat and circumferentially secured.    Under direct vision, the left upper quadrant 10/12 mm trocar site was approximated using 0 Vicryl suture via the Anthony-Yen fascial closure device. The  remaining 5 mm trochars were removed and there was no evidence of bleeding. The pneumoperitoneum was completely evacuated.  Subcutaneous tissues at the 10/12 mm trocar site were approximated using interrupted 3-0 Vicryl sutures. The tiny stab wound at the hernia site was approximated with the skin level using a 5-0 fast absorbing suture. This was dressed with a single Steri-Strip. Each trocar incision was then approximated at the skin level using running 4-0 undyed subcuticular Vicryl sutures.  The wounds were cleaned and dried, and benzoin and Steri-Strips were placed followed by dry sterile bio occlusive dressings.     The patient tolerated the procedure well. Sponge, needle, and instrument counts were correct times 2. Total IVF were  800 cc of crystalloid, EBL was < 5 cc, and urine output was 200 ml over 40 minutes  The patient was extubated in the operating room, and taken to the PACU with stable vital signs and in excellent condition.    Ramone Engel M.D.    Complications:  None; patient tolerated the procedure well.    Disposition: PACU - hemodynamically stable.  Condition: stable         Additional Details: none    Attending  Attestation: I was present and scrubbed for the entire procedure.    Ramone Engel  Phone Number: [phone]

## 2023-10-19 NOTE — DISCHARGE INSTRUCTIONS
May restart Eliquis morning dose Friday, 11/20/2023  For postoperative analgesia/pain relief, I recommend:   a.  Tylenol Extra Strength 500 mg 4 times a day with food, on schedule, for 2-3 days, then as needed thereafter.    b.  Supplement with oxycodone 5 mg, dispense #18 for more severe or breakthrough pain, as needed.  This has been electronically prescribed to your pharmacy.  Please  this prescription within 7 days of today's visit, or the pharmacist will not fill the prescription.

## 2023-10-19 NOTE — POST-PROCEDURE NOTE
4:19 PM  Late entry: No need to void before discharge per Dr. Engel.    5:47 PM  Late entry: Pt is tolerated PO snack well.  Reviewed discharge instructions, educated pt and family on anesthesia safety.   All pre-op belongings with the pt.

## 2023-10-20 ENCOUNTER — TELEPHONE (OUTPATIENT)
Dept: SURGERY | Facility: CLINIC | Age: 68
End: 2023-10-20
Payer: MEDICARE

## 2023-10-20 ENCOUNTER — HOSPITAL ENCOUNTER (OUTPATIENT)
Dept: CARDIOLOGY | Facility: HOSPITAL | Age: 68
Discharge: HOME | End: 2023-10-20

## 2023-10-20 DIAGNOSIS — K43.2 INCISIONAL HERNIA, WITHOUT OBSTRUCTION OR GANGRENE: Primary | ICD-10-CM

## 2023-10-20 LAB
ATRIAL RATE: 56 BPM
P AXIS: 58 DEGREES
P OFFSET: 174 MS
P ONSET: 133 MS
PR INTERVAL: 174 MS
Q ONSET: 220 MS
QRS COUNT: 10 BEATS
QRS DURATION: 78 MS
QT INTERVAL: 424 MS
QTC CALCULATION(BAZETT): 409 MS
QTC FREDERICIA: 414 MS
R AXIS: 33 DEGREES
T AXIS: 38 DEGREES
T OFFSET: 432 MS
VENTRICULAR RATE: 56 BPM

## 2023-10-20 PROCEDURE — 93010 ELECTROCARDIOGRAM REPORT: CPT | Performed by: STUDENT IN AN ORGANIZED HEALTH CARE EDUCATION/TRAINING PROGRAM

## 2023-10-20 RX ORDER — GABAPENTIN 300 MG/1
300 CAPSULE ORAL 3 TIMES DAILY
Qty: 30 CAPSULE | Refills: 0 | Status: SHIPPED | OUTPATIENT
Start: 2023-10-20 | End: 2024-05-21 | Stop reason: ALTCHOICE

## 2023-10-20 NOTE — TELEPHONE ENCOUNTER
Maria Antonia, the pts spouse phones in. States pt has been taking the pain pills Dr. Engel prescribed, along with alterbating tylenol, and moptrin, but is still having pain. Wants to know if he can have something stronger?   Please Advise.

## 2023-10-23 ENCOUNTER — TELEPHONE (OUTPATIENT)
Dept: SURGERY | Facility: CLINIC | Age: 68
End: 2023-10-23
Payer: MEDICARE

## 2023-10-23 DIAGNOSIS — K43.2 INCISIONAL HERNIA, WITHOUT OBSTRUCTION OR GANGRENE: Primary | ICD-10-CM

## 2023-10-23 RX ORDER — OXYCODONE HYDROCHLORIDE 5 MG/1
5 TABLET ORAL EVERY 6 HOURS PRN
Qty: 16 TABLET | Refills: 0 | Status: SHIPPED | OUTPATIENT
Start: 2023-10-23 | End: 2023-10-27

## 2023-10-23 NOTE — TELEPHONE ENCOUNTER
PT WIFE CALLING FOR REFILL ON OXYCODONE 5MG Q6 HR PRN. I DO SEE DR. MACK SENT RX FOR GABAPENTIN 10/20/23.

## 2023-10-30 LAB
LABORATORY COMMENT REPORT: NORMAL
PATH REPORT.FINAL DX SPEC: NORMAL
PATH REPORT.GROSS SPEC: NORMAL
PATH REPORT.RELEVANT HX SPEC: NORMAL
PATH REPORT.TOTAL CANCER: NORMAL

## 2023-11-06 NOTE — PROGRESS NOTES
Post-Operative Office Visit  Herman Merino presents for his postoperative visit.  On 10/19/2023 the patient underwent a laparoscopic repair of reducible umbilical and incisional hernias, with mesh.  Please see the operative note for details.  There was no operative specimen.    The patient's postoperative course was primarily characterized by abdominal wall pain.  This lasted at least 10 days.  He used oxycodone dispense #16 with 1 refill, and then I called him gabapentin dispense #30 p.o. 3 times daily 300 mg.  This multi multiple analgesia helped.  He is only taking an occasional Tylenol as needed at this point.  He did have some constipation for the first 4 days postoperative but this resolved.  He had some minimal bleeding from the left upper quadrant laparoscopic incision.    The patient presents with his wife.  They live in [place].  He is self-employed and does large industrial repairs.    Vitals  There were no vitals taken for this visit.     Exam    Post Op Abdominal Physical Exam    The physical exam findings are as follows:    General  General Appearance - Not in acute distress.    Integumentary  Abdominal Incision -laparoscopic x4, 1 in each quadrant, in addition to puncture site superior to the umbilicus  - dry, Intact, No evidence of infection, hematoma, or seroma, edges well-approximated.  No drainage present, no redness and no warmth to the touch.  Steri-Strips removed.    Chest and Lung Exam  Auscultation:  Breath sounds: - Normal and equal bilaterally.    Adventitious sounds: none    Cardiovascular  Auscultation: Rhythm - Regular. Rate - Regular.  Heart Sounds - Normal heart sounds.    Abdomen  Inspection: - Inspection Normal.  Palpation/Percussion: Palpation and Percussion of the abdomen reveal - Non Tender, No Rebound tenderness, No Rigidity (guarding) and No Palpable abdominal masses.  Liver: - Normal.  Spleen: - Normal.  Auscultation: Auscultation of the abdomen reveals - Bowel sounds  normal and No Abdominal bruits.  Surgical scars: Multiple laparoscopic      Assessment and Plan    Mr. Merino has done very well postoperatively.    Recommendations:    Activity as tolerated    At 4 weeks postoperatively, 11/16/2023, the patient may increase activity other than heavy lifting; may return to electrical and lighter activities at work    At 6 weeks postoperatively, Thursday, 11/30/2023, may resume all activity and lifting without restrictions whatsoever    Follow-up with me in 6 months for recheck

## 2023-11-07 ENCOUNTER — OFFICE VISIT (OUTPATIENT)
Dept: SURGERY | Facility: CLINIC | Age: 68
End: 2023-11-07
Payer: MEDICARE

## 2023-11-07 VITALS
DIASTOLIC BLOOD PRESSURE: 84 MMHG | RESPIRATION RATE: 16 BRPM | HEIGHT: 70 IN | BODY MASS INDEX: 30.06 KG/M2 | OXYGEN SATURATION: 95 % | WEIGHT: 210 LBS | SYSTOLIC BLOOD PRESSURE: 142 MMHG | HEART RATE: 72 BPM

## 2023-11-07 DIAGNOSIS — K43.2 INCISIONAL HERNIA, WITHOUT OBSTRUCTION OR GANGRENE: Primary | ICD-10-CM

## 2023-11-07 DIAGNOSIS — K42.9 UMBILICAL HERNIA WITHOUT OBSTRUCTION AND WITHOUT GANGRENE: ICD-10-CM

## 2023-11-07 PROCEDURE — 3079F DIAST BP 80-89 MM HG: CPT | Performed by: SURGERY

## 2023-11-07 PROCEDURE — 1160F RVW MEDS BY RX/DR IN RCRD: CPT | Performed by: SURGERY

## 2023-11-07 PROCEDURE — 1036F TOBACCO NON-USER: CPT | Performed by: SURGERY

## 2023-11-07 PROCEDURE — 1159F MED LIST DOCD IN RCRD: CPT | Performed by: SURGERY

## 2023-11-07 PROCEDURE — 3077F SYST BP >= 140 MM HG: CPT | Performed by: SURGERY

## 2023-11-07 PROCEDURE — 99024 POSTOP FOLLOW-UP VISIT: CPT | Performed by: SURGERY

## 2023-11-07 PROCEDURE — 1125F AMNT PAIN NOTED PAIN PRSNT: CPT | Performed by: SURGERY

## 2024-01-22 ENCOUNTER — EVALUATION (OUTPATIENT)
Dept: PHYSICAL THERAPY | Facility: CLINIC | Age: 69
End: 2024-01-22
Payer: MEDICARE

## 2024-01-22 DIAGNOSIS — S86.312A STRAIN OF MUSCLE(S) AND TENDON(S) OF PERONEAL MUSCLE GROUP AT LOWER LEG LEVEL, LEFT LEG, INITIAL ENCOUNTER: ICD-10-CM

## 2024-01-22 DIAGNOSIS — M25.572 LEFT ANKLE PAIN: Primary | ICD-10-CM

## 2024-01-22 PROCEDURE — 97161 PT EVAL LOW COMPLEX 20 MIN: CPT | Mod: GP | Performed by: PHYSICAL THERAPIST

## 2024-01-22 PROCEDURE — 97110 THERAPEUTIC EXERCISES: CPT | Mod: GP | Performed by: PHYSICAL THERAPIST

## 2024-01-22 ASSESSMENT — PATIENT HEALTH QUESTIONNAIRE - PHQ9
2. FEELING DOWN, DEPRESSED OR HOPELESS: NOT AT ALL
SUM OF ALL RESPONSES TO PHQ9 QUESTIONS 1 AND 2: 0
1. LITTLE INTEREST OR PLEASURE IN DOING THINGS: NOT AT ALL
SUM OF ALL RESPONSES TO PHQ9 QUESTIONS 1 AND 2: 0
2. FEELING DOWN, DEPRESSED OR HOPELESS: NOT AT ALL
1. LITTLE INTEREST OR PLEASURE IN DOING THINGS: NOT AT ALL

## 2024-01-22 ASSESSMENT — ENCOUNTER SYMPTOMS
LOSS OF SENSATION IN FEET: 0
DEPRESSION: 0
OCCASIONAL FEELINGS OF UNSTEADINESS: 0

## 2024-01-22 ASSESSMENT — PAIN SCALES - GENERAL: PAINLEVEL_OUTOF10: 0 - NO PAIN

## 2024-01-22 ASSESSMENT — PAIN - FUNCTIONAL ASSESSMENT: PAIN_FUNCTIONAL_ASSESSMENT: 0-10

## 2024-01-22 NOTE — PROGRESS NOTES
Physical Therapy    Physical Therapy Lower Extremity Evaluation    Patient Name: Herman Merino  MRN: [id number]  Today's Date: 1/22/2024  Time Calculation  Start Time: 1401  Stop Time: 1443  Time Calculation (min): 42 min    Current Problem  Problem List Items Addressed This Visit    None  Visit Diagnoses         Codes    Left ankle pain    -  Primary M25.572    Relevant Orders    Follow Up In Physical Therapy    Strain of muscle(s) and tendon(s) of peroneal muscle group at lower leg level, left leg, initial encounter     S86.312A               Precautions  Precautions  Precautions Comment: Hx of afib. skin ca       Pain  Pain Assessment: 0-10  Pain Score: 0 - No pain  Pain at worst: 8/10    SUBJECTIVE:   Chief complaint:  L lateral ankle pain.   Sprained 6 months ago on edge of sidewalk  Some swelling/bruising    Reviewed medical hx form     Aggravating factors:  Static positioning-I.e. driving   Kneeling down at work     Alleviating factors:  Brace (Aircast and lace up)     Imaging:  Xray     Prior level of function:   Previously independent with all activity     Functional limitations:  Walking long distance  Hiking   Working     Home setup:  Stairs to basement     Work:  Industrial      Patient stated goal:  Hike/walk long distance with 30 lb pack 10-15 miles per day for 3-5 days continuous     Prior tx:  None     OBJECTIVE:    Lower Extremity ROM: (WNL unless documented below)   ROM in Degrees  RIGHT LEFT   Ankle DF 7 -2 pain in lateral calf   Ankle PF 65 50   Ankle INV 30 28   Ankle EV 7 7     Lower Extremity STRENGTH: (WNL unless documented below)   MMT 5/5 max  RIGHT LEFT   Ankle DF 5 4+   Ankle PF 5 4+   Ankle INV 5 4+   Ankle EV 5 4+     Lower Extremity FLEXIBILITY: (WNL unless documented below)   Flexibility  RIGHT LEFT   Quad     Hamstring      Hip flexor      Piriformis      ITB      Gastroc   Mod loss   Soleus   Mod loss     Balance: SLSB R: 5 sec, L: 3 sec ankle righting bilaterally  L>R  Gait mechanics: Non antalgic    TTP over CFL on L     Special tests: (WNL unless documented below)     ANKLE RIGHT LEFT   Anterior Drawer  -   Squeeze for high ankle sprain  -   Yen's  NA   Talar Tilt  -       TREATMENT:  Initial evaluation completed. Issued and reviewed HEP with pt that included:  Access Code: [email or web address]  URL: [email or web address]  Date: 01/22/2024  Prepared by: Merari York    Exercises  - Standing Gastroc Stretch  - 2 x daily - 7 x weekly - 2 reps - 30 seconds hold  - Standing Soleus Stretch  - 2 x daily - 7 x weekly - 2 reps - 30 second hold  - Tandem Stance  - 1 x daily - 7 x weekly - 2 reps - 30 seconds hold  - Heel Toe Raises with Counter Support  - 1 x daily - 7 x weekly - 3 sets - 10 reps  - Seated Ankle Dorsiflexion with Resistance  - 1 x daily - 7 x weekly - 3 sets - 10 reps  - Long Sitting Ankle Eversion with Resistance  - 1 x daily - 7 x weekly - 3 sets - 10 reps  - Long Sitting Ankle Inversion with Resistance (Mirrored)  - 1 x daily - 7 x weekly - 3 sets - 10 reps  - Long Sitting Ankle Plantar Flexion with Resistance  - 1 x daily - 7 x weekly - 3 sets - 10 reps    Patient Education  - Lateral Ankle Sprain    Outcome Measure:  Other Measures  Lower Extremity Funtional Score (LEFS): 58/80    ASSESSEMENT  The pt presents with signs and symptoms consistent with the physical therapy diagnosis of L ankle pain. Sustained lateral ankle sprain over 6 months ago.   Pt demonstrates decreased L ankle ROM and strength compared to R. He has notable gastroc soleus tightness on the L.  The pt will benefit from skilled physical therapy to reduce impairments in order to return to prior level of function, reduce pain, increase strength and ROM and restore gait/balance.     The physical therapy prognosis is good for the patient to achieve their goals.   The pt tolerated therapy treatment today well with no adverse effects.  Barriers to therapy include:  None    PLAN  The  pt will be seen 1 time(s) a week for 4-6 weeks.      The pt has been educated about the risks and benefits of physical therapy including manual therapy treatments and gives consent for treatment.     The patient will benefit from physical therapy treatment to include: therapeutic exercises, therapeutic activities, neurological re-education, manual therapy, modalities, and a home exercise program.     Goals:  Active       PT Problem       Reduce pain at worst to 2-3/10 with all functional and recreational activity.        Start:  01/22/24    Expected End:  04/21/24            Increase ROM/flexibility to WFL to perform daily functional activities including walking, hiking, stair negotiation        Start:  01/22/24    Expected End:  04/21/24            Increase by > or = 1/2 mm grade to improve stability walking on uneven and even terrain, stair negotiation without increased pain/compensation         Start:  01/22/24    Expected End:  04/21/24            Pt to score an increase of 10 points or > on LEFS to display overall increased function.         Start:  01/22/24    Expected End:  04/21/24            Patient will demonstrate independence in home program for support of progression       Start:  01/22/24    Expected End:  04/21/24

## 2024-01-22 NOTE — LETTER
January 22, 2024      No Recipients    Patient: Herman Merino   YOB: 1955   Date of Visit: 1/22/2024       Dear No referring provider defined for this encounter.    The attached plan of care is being sent to you because your patient’s medical reimbursement requires that you certify the plan of care. Your signature is required to allow uninterrupted insurance coverage.      You may indicate your approval by signing below and faxing this form back to us at Dept Fax: [phone].    Please call Dept: [phone] with any questions or concerns.    Thank you for this referral,        Merari York, PT  [facility] [address]  [address] [address]    Payer: Payor: MEDICARE / Plan: MEDICARE PART A AND B / Product Type: *No Product type* /                                                                         Date:     Dear Merari York PT,     Re: Mr. Herman Merino, MRN:[id number]    I certify that I have reviewed the attached plan of care and it is medically necessary for Mr. Herman Merino (1955) who is under my care.          ______________________________________                    _________________  Provider name and credentials                                           Date and time                                                                                           Plan of Care 1/22/24   Effective from: 1/22/2024  Effective to: 4/21/2024    Plan ID: [id number]            Participants as of Finalize on 1/22/2024    Name Type Comments Contact Info    Merari York PT Physical Therapist  [phone]       Last Plan Note     Author: Merari York PT Status: Incomplete Last edited: 1/22/2024  2:00 PM       Physical Therapy    Physical Therapy Lower Extremity Evaluation    Patient Name: Herman Merino  MRN: [id number]  Today's Date: 1/22/2024  Time Calculation  Start Time: 1401  Stop Time: 1443  Time Calculation (min): 42  min    Current Problem  Problem List Items Addressed This Visit    None  Visit Diagnoses         Codes    Left ankle pain    -  Primary M25.572    Relevant Orders    Follow Up In Physical Therapy    Strain of muscle(s) and tendon(s) of peroneal muscle group at lower leg level, left leg, initial encounter     S86.312A               Precautions  Precautions  Precautions Comment: Hx of afib. skin ca       Pain  Pain Assessment: 0-10  Pain Score: 0 - No pain  Pain at worst: 8/10    SUBJECTIVE:   Chief complaint:  L lateral ankle pain.   Sprained 6 months ago on edge of sidewalk  Some swelling/bruising    Reviewed medical hx form     Aggravating factors:  Static positioning-I.e. driving   Kneeling down at work     Alleviating factors:  Brace (Aircast and lace up)     Imaging:  Xray     Prior level of function:   Previously independent with all activity     Functional limitations:  Walking long distance  Hiking   Working     Home setup:  Stairs to basement     Work:  Industrial      Patient stated goal:  Hike/walk long distance with 30 lb pack 10-15 miles per day for 3-5 days continuous     Prior tx:  None     OBJECTIVE:    Lower Extremity ROM: (WNL unless documented below)   ROM in Degrees  RIGHT LEFT   Ankle DF 7 -2 pain in lateral calf   Ankle PF 65 50   Ankle INV 30 28   Ankle EV 7 7     Lower Extremity STRENGTH: (WNL unless documented below)   MMT 5/5 max  RIGHT LEFT   Ankle DF 5 4+   Ankle PF 5 4+   Ankle INV 5 4+   Ankle EV 5 4+     Lower Extremity FLEXIBILITY: (WNL unless documented below)   Flexibility  RIGHT LEFT   Quad     Hamstring      Hip flexor      Piriformis      ITB      Gastroc   Mod loss   Soleus   Mod loss     Balance: SLSB R: 5 sec, L: 3 sec ankle righting bilaterally L>R  Gait mechanics: Non antalgic    TTP over CFL on L     Special tests: (WNL unless documented below)     ANKLE RIGHT LEFT   Anterior Drawer  -   Squeeze for high ankle sprain  -   Yen's  NA   Talar Tilt  -        TREATMENT:  Initial evaluation completed. Issued and reviewed HEP with pt that included:  Access Code: [email or web address]  URL: [email or web address]  Date: 01/22/2024  Prepared by: Merari York    Exercises  - Standing Gastroc Stretch  - 2 x daily - 7 x weekly - 2 reps - 30 seconds hold  - Standing Soleus Stretch  - 2 x daily - 7 x weekly - 2 reps - 30 second hold  - Tandem Stance  - 1 x daily - 7 x weekly - 2 reps - 30 seconds hold  - Heel Toe Raises with Counter Support  - 1 x daily - 7 x weekly - 3 sets - 10 reps  - Seated Ankle Dorsiflexion with Resistance  - 1 x daily - 7 x weekly - 3 sets - 10 reps  - Long Sitting Ankle Eversion with Resistance  - 1 x daily - 7 x weekly - 3 sets - 10 reps  - Long Sitting Ankle Inversion with Resistance (Mirrored)  - 1 x daily - 7 x weekly - 3 sets - 10 reps  - Long Sitting Ankle Plantar Flexion with Resistance  - 1 x daily - 7 x weekly - 3 sets - 10 reps    Patient Education  - Lateral Ankle Sprain    Outcome Measure:  Other Measures  Lower Extremity Funtional Score (LEFS): 58/80    ASSESSEMENT  The pt presents with signs and symptoms consistent with the physical therapy diagnosis of L ankle pain. Sustained lateral ankle sprain over 6 months ago.   Pt demonstrates decreased L ankle ROM and strength compared to R. He has notable gastroc soleus tightness on the L.  The pt will benefit from skilled physical therapy to reduce impairments in order to return to prior level of function, reduce pain, increase strength and ROM and restore gait/balance.     The physical therapy prognosis is good for the patient to achieve their goals.   The pt tolerated therapy treatment today well with no adverse effects.  Barriers to therapy include:  None    PLAN  The pt will be seen 1 time(s) a week for 4-6 weeks.      The pt has been educated about the risks and benefits of physical therapy including manual therapy treatments and gives consent for treatment.     The patient  will benefit from physical therapy treatment to include: therapeutic exercises, therapeutic activities, neurological re-education, manual therapy, modalities, and a home exercise program.     Goals:  Active       PT Problem       Reduce pain at worst to 2-3/10 with all functional and recreational activity.        Start:  01/22/24    Expected End:  04/21/24            Increase ROM/flexibility to WFL to perform daily functional activities including walking, hiking, stair negotiation        Start:  01/22/24    Expected End:  04/21/24            Increase by > or = 1/2 mm grade to improve stability walking on uneven and even terrain, stair negotiation without increased pain/compensation         Start:  01/22/24    Expected End:  04/21/24            Pt to score an increase of 10 points or > on LEFS to display overall increased function.         Start:  01/22/24    Expected End:  04/21/24            Patient will demonstrate independence in home program for support of progression       Start:  01/22/24    Expected End:  04/21/24                                      Current Participants as of 1/22/2024    Name Type Comments Contact Info    Merari York PT Physical Therapist  [phone]    Signed by Merari York PT     Signature documented by Merari Yrok PT at 1/22/2024 1509 EST     Reason: Participant Signed on Paper

## 2024-01-22 NOTE — LETTER
January 22, 2024    Merari York, PT  [address]  Rehab Services  [facility] [address]    Patient: Herman Merino   YOB: 1955   Date of Visit: 1/22/2024       Dear No referring provider defined for this encounter.    The attached plan of care is being sent to you because your patient’s medical reimbursement requires that you certify the plan of care. Your signature is required to allow uninterrupted insurance coverage.      You may indicate your approval by signing below and faxing this form back to us at Dept Fax: [phone].    Please call Dept: [phone] with any questions or concerns.    Thank you for this referral,        Merari York, PT  [facility] [address] [facility] [address]  [address]  [facility] [address]    Payer: Payor: MEDICARE / Plan: MEDICARE PART A AND B / Product Type: *No Product type* /                                                                         Date:     Dear Merari York PT,     Re: Mr. Herman Merino, MRN:[id number]    I certify that I have reviewed the attached plan of care and it is medically necessary for Mr. Herman Merino (1955) who is under my care.          ______________________________________                    _________________  Provider name and credentials                                           Date and time                                                                                           Plan of Care 1/22/24   Effective from: 1/22/2024  Effective to: 4/21/2024    Plan ID: [id number]            Participants as of Finalize on 1/22/2024    Name Type Comments Contact Info    Merari York PT Physical Therapist  [phone]       Last Plan Note     Author: Merari York PT Status: Incomplete Last edited: 1/22/2024  2:00 PM       Physical Therapy    Physical Therapy Lower Extremity Evaluation    Patient Name: Herman Merino  MRN: [id number]  Today's Date: 1/22/2024  Time Calculation  Start Time:  1401  Stop Time: 1443  Time Calculation (min): 42 min    Current Problem  Problem List Items Addressed This Visit    None  Visit Diagnoses         Codes    Left ankle pain    -  Primary M25.572    Relevant Orders    Follow Up In Physical Therapy    Strain of muscle(s) and tendon(s) of peroneal muscle group at lower leg level, left leg, initial encounter     S86.312A               Precautions  Precautions  Precautions Comment: Hx of afib. skin ca       Pain  Pain Assessment: 0-10  Pain Score: 0 - No pain  Pain at worst: 8/10    SUBJECTIVE:   Chief complaint:  L lateral ankle pain.   Sprained 6 months ago on edge of sidewalk  Some swelling/bruising    Reviewed medical hx form     Aggravating factors:  Static positioning-I.e. driving   Kneeling down at work     Alleviating factors:  Brace (Aircast and lace up)     Imaging:  Xray     Prior level of function:   Previously independent with all activity     Functional limitations:  Walking long distance  Hiking   Working     Home setup:  Stairs to basement     Work:  Industrial      Patient stated goal:  Hike/walk long distance with 30 lb pack 10-15 miles per day for 3-5 days continuous     Prior tx:  None     OBJECTIVE:    Lower Extremity ROM: (WNL unless documented below)   ROM in Degrees  RIGHT LEFT   Ankle DF 7 -2 pain in lateral calf   Ankle PF 65 50   Ankle INV 30 28   Ankle EV 7 7     Lower Extremity STRENGTH: (WNL unless documented below)   MMT 5/5 max  RIGHT LEFT   Ankle DF 5 4+   Ankle PF 5 4+   Ankle INV 5 4+   Ankle EV 5 4+     Lower Extremity FLEXIBILITY: (WNL unless documented below)   Flexibility  RIGHT LEFT   Quad     Hamstring      Hip flexor      Piriformis      ITB      Gastroc   Mod loss   Soleus   Mod loss     Balance: SLSB R: 5 sec, L: 3 sec ankle righting bilaterally L>R  Gait mechanics: Non antalgic    TTP over CFL on L     Special tests: (WNL unless documented below)     ANKLE RIGHT LEFT   Anterior Drawer  -   Squeeze for high ankle  sprain  -   Yen's  NA   Talar Tilt  -       TREATMENT:  Initial evaluation completed. Issued and reviewed HEP with pt that included:  Access Code: [email or web address]  URL: [email or web address]  Date: 01/22/2024  Prepared by: Merari York    Exercises  - Standing Gastroc Stretch  - 2 x daily - 7 x weekly - 2 reps - 30 seconds hold  - Standing Soleus Stretch  - 2 x daily - 7 x weekly - 2 reps - 30 second hold  - Tandem Stance  - 1 x daily - 7 x weekly - 2 reps - 30 seconds hold  - Heel Toe Raises with Counter Support  - 1 x daily - 7 x weekly - 3 sets - 10 reps  - Seated Ankle Dorsiflexion with Resistance  - 1 x daily - 7 x weekly - 3 sets - 10 reps  - Long Sitting Ankle Eversion with Resistance  - 1 x daily - 7 x weekly - 3 sets - 10 reps  - Long Sitting Ankle Inversion with Resistance (Mirrored)  - 1 x daily - 7 x weekly - 3 sets - 10 reps  - Long Sitting Ankle Plantar Flexion with Resistance  - 1 x daily - 7 x weekly - 3 sets - 10 reps    Patient Education  - Lateral Ankle Sprain    Outcome Measure:  Other Measures  Lower Extremity Funtional Score (LEFS): 58/80    ASSESSEMENT  The pt presents with signs and symptoms consistent with the physical therapy diagnosis of L ankle pain. Sustained lateral ankle sprain over 6 months ago.   Pt demonstrates decreased L ankle ROM and strength compared to R. He has notable gastroc soleus tightness on the L.  The pt will benefit from skilled physical therapy to reduce impairments in order to return to prior level of function, reduce pain, increase strength and ROM and restore gait/balance.     The physical therapy prognosis is good for the patient to achieve their goals.   The pt tolerated therapy treatment today well with no adverse effects.  Barriers to therapy include:  None    PLAN  The pt will be seen 1 time(s) a week for 4-6 weeks.      The pt has been educated about the risks and benefits of physical therapy including manual therapy treatments and  gives consent for treatment.     The patient will benefit from physical therapy treatment to include: therapeutic exercises, therapeutic activities, neurological re-education, manual therapy, modalities, and a home exercise program.     Goals:  Active       PT Problem       Reduce pain at worst to 2-3/10 with all functional and recreational activity.        Start:  01/22/24    Expected End:  04/21/24            Increase ROM/flexibility to WFL to perform daily functional activities including walking, hiking, stair negotiation        Start:  01/22/24    Expected End:  04/21/24            Increase by > or = 1/2 mm grade to improve stability walking on uneven and even terrain, stair negotiation without increased pain/compensation         Start:  01/22/24    Expected End:  04/21/24            Pt to score an increase of 10 points or > on LEFS to display overall increased function.         Start:  01/22/24    Expected End:  04/21/24            Patient will demonstrate independence in home program for support of progression       Start:  01/22/24    Expected End:  04/21/24                                      Current Participants as of 1/22/2024    Name Type Comments Contact Info    Merari York, PT Physical Therapist  [phone]    Signature pending

## 2024-01-22 NOTE — LETTER
January 22, 2024    Ferdinand Dietrich PA-C  [address]   [address]  [facility] [address]    Patient: Herman Merino   YOB: 1955   Date of Visit: 1/22/2024       Dear No referring provider defined for this encounter.    The attached plan of care is being sent to you because your patient’s medical reimbursement requires that you certify the plan of care. Your signature is required to allow uninterrupted insurance coverage.      You may indicate your approval by signing below and faxing this form back to us at Dept Fax: [phone].    Please call Dept: [phone] with any questions or concerns.    Thank you for this referral,        Merari York, PT  [facility] [address] [facility]  [address] [address]    Payer: Payor: MEDICARE / Plan: MEDICARE PART A AND B / Product Type: *No Product type* /                                                                         Date:     Dear Merari York, PT,     Re: Mr. Herman Merino, MRN:[id number]    I certify that I have reviewed the attached plan of care and it is medically necessary for Mr. Herman Merino (1955) who is under my care.          ______________________________________                    _________________  Provider name and credentials                                           Date and time                                                                                           Plan of Care 1/22/24   Effective from: 1/22/2024  Effective to: 4/21/2024    Plan ID: [id number]            Participants as of Finalize on 1/22/2024    Name Type Comments Contact Info    Ferdinand Dietrich PA-C Consulting Physician  [phone]       Last Plan Note     Author: Merari York PT Status: Sign when Signing Visit Last edited: 1/22/2024  2:00 PM       Physical Therapy    Physical Therapy Lower Extremity Evaluation    Patient Name: Herman Merino  MRN:  [id number]  Today's Date: 1/22/2024  Time Calculation  Start Time: 1401  Stop Time: 1443  Time Calculation (min): 42 min    Current Problem  Problem List Items Addressed This Visit    None  Visit Diagnoses         Codes    Left ankle pain    -  Primary M25.572    Relevant Orders    Follow Up In Physical Therapy    Strain of muscle(s) and tendon(s) of peroneal muscle group at lower leg level, left leg, initial encounter     S86.312A               Precautions  Precautions  Precautions Comment: Hx of afib. skin ca       Pain  Pain Assessment: 0-10  Pain Score: 0 - No pain  Pain at worst: 8/10    SUBJECTIVE:   Chief complaint:  L lateral ankle pain.   Sprained 6 months ago on edge of sidewalk  Some swelling/bruising    Reviewed medical hx form     Aggravating factors:  Static positioning-I.e. driving   Kneeling down at work     Alleviating factors:  Brace (Aircast and lace up)     Imaging:  Xray     Prior level of function:   Previously independent with all activity     Functional limitations:  Walking long distance  Hiking   Working     Home setup:  Stairs to basement     Work:  Industrial      Patient stated goal:  Hike/walk long distance with 30 lb pack 10-15 miles per day for 3-5 days continuous     Prior tx:  None     OBJECTIVE:    Lower Extremity ROM: (WNL unless documented below)   ROM in Degrees  RIGHT LEFT   Ankle DF 7 -2 pain in lateral calf   Ankle PF 65 50   Ankle INV 30 28   Ankle EV 7 7     Lower Extremity STRENGTH: (WNL unless documented below)   MMT 5/5 max  RIGHT LEFT   Ankle DF 5 4+   Ankle PF 5 4+   Ankle INV 5 4+   Ankle EV 5 4+     Lower Extremity FLEXIBILITY: (WNL unless documented below)   Flexibility  RIGHT LEFT   Quad     Hamstring      Hip flexor      Piriformis      ITB      Gastroc   Mod loss   Soleus   Mod loss     Balance: SLSB R: 5 sec, L: 3 sec ankle righting bilaterally L>R  Gait mechanics: Non antalgic    TTP over CFL on L     Special tests: (WNL unless documented below)      ANKLE RIGHT LEFT   Anterior Drawer  -   Squeeze for high ankle sprain  -   Yen's  NA   Talar Tilt  -       TREATMENT:  Initial evaluation completed. Issued and reviewed HEP with pt that included:  Access Code: [email or web address]  URL: [email or web address]  Date: 01/22/2024  Prepared by: Merari York    Exercises  - Standing Gastroc Stretch  - 2 x daily - 7 x weekly - 2 reps - 30 seconds hold  - Standing Soleus Stretch  - 2 x daily - 7 x weekly - 2 reps - 30 second hold  - Tandem Stance  - 1 x daily - 7 x weekly - 2 reps - 30 seconds hold  - Heel Toe Raises with Counter Support  - 1 x daily - 7 x weekly - 3 sets - 10 reps  - Seated Ankle Dorsiflexion with Resistance  - 1 x daily - 7 x weekly - 3 sets - 10 reps  - Long Sitting Ankle Eversion with Resistance  - 1 x daily - 7 x weekly - 3 sets - 10 reps  - Long Sitting Ankle Inversion with Resistance (Mirrored)  - 1 x daily - 7 x weekly - 3 sets - 10 reps  - Long Sitting Ankle Plantar Flexion with Resistance  - 1 x daily - 7 x weekly - 3 sets - 10 reps    Patient Education  - Lateral Ankle Sprain    Outcome Measure:  Other Measures  Lower Extremity Funtional Score (LEFS): 58/80    ASSESSEMENT  The pt presents with signs and symptoms consistent with the physical therapy diagnosis of L ankle pain. Sustained lateral ankle sprain over 6 months ago.   Pt demonstrates decreased L ankle ROM and strength compared to R. He has notable gastroc soleus tightness on the L.  The pt will benefit from skilled physical therapy to reduce impairments in order to return to prior level of function, reduce pain, increase strength and ROM and restore gait/balance.     The physical therapy prognosis is good for the patient to achieve their goals.   The pt tolerated therapy treatment today well with no adverse effects.  Barriers to therapy include:  None    PLAN  The pt will be seen 1 time(s) a week for 4-6 weeks.      The pt has been educated about the risks and  benefits of physical therapy including manual therapy treatments and gives consent for treatment.     The patient will benefit from physical therapy treatment to include: therapeutic exercises, therapeutic activities, neurological re-education, manual therapy, modalities, and a home exercise program.     Goals:  Active       PT Problem       Reduce pain at worst to 2-3/10 with all functional and recreational activity.        Start:  01/22/24    Expected End:  04/21/24            Increase ROM/flexibility to WFL to perform daily functional activities including walking, hiking, stair negotiation        Start:  01/22/24    Expected End:  04/21/24            Increase by > or = 1/2 mm grade to improve stability walking on uneven and even terrain, stair negotiation without increased pain/compensation         Start:  01/22/24    Expected End:  04/21/24            Pt to score an increase of 10 points or > on LEFS to display overall increased function.         Start:  01/22/24    Expected End:  04/21/24            Patient will demonstrate independence in home program for support of progression       Start:  01/22/24    Expected End:  04/21/24                                      Current Participants as of 1/22/2024    Name Type Comments Contact Info    Ferdinand Dietrich PA-C Consulting Physician  [phone]    Signature pending

## 2024-01-22 NOTE — LETTER
January 22, 2024      No Recipients    Patient: Herman Merino   YOB: 1955   Date of Visit: 1/22/2024       Dear No referring provider defined for this encounter.    The attached plan of care is being sent to you because your patient’s medical reimbursement requires that you certify the plan of care. Your signature is required to allow uninterrupted insurance coverage.      You may indicate your approval by signing below and faxing this form back to us at Dept Fax: [phone].    Please call Dept: [phone] with any questions or concerns.    Thank you for this referral,        Merari York, PT  [facility] [address] [facility]  [address] [address]    Payer: Payor: MEDICARE / Plan: MEDICARE PART A AND B / Product Type: *No Product type* /                                                                         Date:     Dear Merari York PT,     Re: Mr. Herman Merino, MRN:[id number]    I certify that I have reviewed the attached plan of care and it is medically necessary for Mr. Herman Merino (1955) who is under my care.          ______________________________________                    _________________  Provider name and credentials                                           Date and time                                                                                           Plan of Care 1/22/24   Effective from: 1/22/2024  Effective to: 4/21/2024    Plan ID: [id number]            Participants as of Finalize on 1/22/2024    Name Type Comments Contact Info    Merari York PT Physical Therapist  [phone]       Last Plan Note     Author: Merari York PT Status: Incomplete Last edited: 1/22/2024  2:00 PM       Physical Therapy    Physical Therapy Lower Extremity Evaluation    Patient Name: Herman Merino  MRN: [id number]  Today's Date: 1/22/2024  Time Calculation  Start Time: 1401  Stop Time: 1443  Time Calculation (min): 42  min    Current Problem  Problem List Items Addressed This Visit    None  Visit Diagnoses         Codes    Left ankle pain    -  Primary M25.572    Relevant Orders    Follow Up In Physical Therapy    Strain of muscle(s) and tendon(s) of peroneal muscle group at lower leg level, left leg, initial encounter     S86.312A               Precautions  Precautions  Precautions Comment: Hx of afib. skin ca       Pain  Pain Assessment: 0-10  Pain Score: 0 - No pain  Pain at worst: 8/10    SUBJECTIVE:   Chief complaint:  L lateral ankle pain.   Sprained 6 months ago on edge of sidewalk  Some swelling/bruising    Reviewed medical hx form     Aggravating factors:  Static positioning-I.e. driving   Kneeling down at work     Alleviating factors:  Brace (Aircast and lace up)     Imaging:  Xray     Prior level of function:   Previously independent with all activity     Functional limitations:  Walking long distance  Hiking   Working     Home setup:  Stairs to basement     Work:  Industrial      Patient stated goal:  Hike/walk long distance with 30 lb pack 10-15 miles per day for 3-5 days continuous     Prior tx:  None     OBJECTIVE:    Lower Extremity ROM: (WNL unless documented below)   ROM in Degrees  RIGHT LEFT   Ankle DF 7 -2 pain in lateral calf   Ankle PF 65 50   Ankle INV 30 28   Ankle EV 7 7     Lower Extremity STRENGTH: (WNL unless documented below)   MMT 5/5 max  RIGHT LEFT   Ankle DF 5 4+   Ankle PF 5 4+   Ankle INV 5 4+   Ankle EV 5 4+     Lower Extremity FLEXIBILITY: (WNL unless documented below)   Flexibility  RIGHT LEFT   Quad     Hamstring      Hip flexor      Piriformis      ITB      Gastroc   Mod loss   Soleus   Mod loss     Balance: SLSB R: 5 sec, L: 3 sec ankle righting bilaterally L>R  Gait mechanics: Non antalgic    TTP over CFL on L     Special tests: (WNL unless documented below)     ANKLE RIGHT LEFT   Anterior Drawer  -   Squeeze for high ankle sprain  -   Yen's  NA   Talar Tilt  -        TREATMENT:  Initial evaluation completed. Issued and reviewed HEP with pt that included:  Access Code: [email or web address]  URL: [email or web address]  Date: 01/22/2024  Prepared by: Merari York    Exercises  - Standing Gastroc Stretch  - 2 x daily - 7 x weekly - 2 reps - 30 seconds hold  - Standing Soleus Stretch  - 2 x daily - 7 x weekly - 2 reps - 30 second hold  - Tandem Stance  - 1 x daily - 7 x weekly - 2 reps - 30 seconds hold  - Heel Toe Raises with Counter Support  - 1 x daily - 7 x weekly - 3 sets - 10 reps  - Seated Ankle Dorsiflexion with Resistance  - 1 x daily - 7 x weekly - 3 sets - 10 reps  - Long Sitting Ankle Eversion with Resistance  - 1 x daily - 7 x weekly - 3 sets - 10 reps  - Long Sitting Ankle Inversion with Resistance (Mirrored)  - 1 x daily - 7 x weekly - 3 sets - 10 reps  - Long Sitting Ankle Plantar Flexion with Resistance  - 1 x daily - 7 x weekly - 3 sets - 10 reps    Patient Education  - Lateral Ankle Sprain    Outcome Measure:  Other Measures  Lower Extremity Funtional Score (LEFS): 58/80    ASSESSEMENT  The pt presents with signs and symptoms consistent with the physical therapy diagnosis of L ankle pain. Sustained lateral ankle sprain over 6 months ago.   Pt demonstrates decreased L ankle ROM and strength compared to R. He has notable gastroc soleus tightness on the L.  The pt will benefit from skilled physical therapy to reduce impairments in order to return to prior level of function, reduce pain, increase strength and ROM and restore gait/balance.     The physical therapy prognosis is good for the patient to achieve their goals.   The pt tolerated therapy treatment today well with no adverse effects.  Barriers to therapy include:  None    PLAN  The pt will be seen 1 time(s) a week for 4-6 weeks.      The pt has been educated about the risks and benefits of physical therapy including manual therapy treatments and gives consent for treatment.     The patient  will benefit from physical therapy treatment to include: therapeutic exercises, therapeutic activities, neurological re-education, manual therapy, modalities, and a home exercise program.     Goals:  Active       PT Problem       Reduce pain at worst to 2-3/10 with all functional and recreational activity.        Start:  01/22/24    Expected End:  04/21/24            Increase ROM/flexibility to WFL to perform daily functional activities including walking, hiking, stair negotiation        Start:  01/22/24    Expected End:  04/21/24            Increase by > or = 1/2 mm grade to improve stability walking on uneven and even terrain, stair negotiation without increased pain/compensation         Start:  01/22/24    Expected End:  04/21/24            Pt to score an increase of 10 points or > on LEFS to display overall increased function.         Start:  01/22/24    Expected End:  04/21/24            Patient will demonstrate independence in home program for support of progression       Start:  01/22/24    Expected End:  04/21/24                                      Current Participants as of 1/22/2024    Name Type Comments Contact Info    Merari York PT Physical Therapist  [phone]    Signed by Merari York PT     Signature documented by Merari York PT at 1/22/2024 1509 EST     Reason: Participant Signed on Paper

## 2024-01-30 ENCOUNTER — APPOINTMENT (OUTPATIENT)
Dept: PHYSICAL THERAPY | Facility: CLINIC | Age: 69
End: 2024-01-30
Payer: MEDICARE

## 2024-02-05 ENCOUNTER — TREATMENT (OUTPATIENT)
Dept: PHYSICAL THERAPY | Facility: CLINIC | Age: 69
End: 2024-02-05
Payer: MEDICARE

## 2024-02-05 DIAGNOSIS — M25.572 LEFT ANKLE PAIN: ICD-10-CM

## 2024-02-05 PROCEDURE — 97110 THERAPEUTIC EXERCISES: CPT | Mod: GP,CQ

## 2024-02-05 PROCEDURE — 97140 MANUAL THERAPY 1/> REGIONS: CPT | Mod: GP,CQ

## 2024-02-05 ASSESSMENT — PAIN - FUNCTIONAL ASSESSMENT: PAIN_FUNCTIONAL_ASSESSMENT: 0-10

## 2024-02-05 ASSESSMENT — PAIN SCALES - GENERAL: PAINLEVEL_OUTOF10: 0 - NO PAIN

## 2024-02-05 NOTE — PROGRESS NOTES
Physical Therapy Treatment    Patient Name: Herman Merino  MRN: [id number]  Today's Date: 2/5/2024  Time Calculation  Start Time: 0831  Stop Time: 0915  Time Calculation (min): 44 min  Payor: MEDICARE / Plan: MEDICARE PART A AND B / Product Type: *No Product type* /     Current Problem:  Problem List Items Addressed This Visit    None  Visit Diagnoses         Codes    Left ankle pain     M25.572            Subjective   General:  General Comment:  (Visit #: 2 of MN   (1/22/24-4/21/24))  Pt reports ankle is doing well, no pain currently.  HEP is going well.     Pain:  Pain Assessment: 0-10  Pain Score: 0 - No pain  Pain Location: Ankle  Pain Orientation: Left    Precautions:  Precautions  Precautions Comment: Hx of afib. skin ca      Objective   No objective measures taken this visit    Treatment:  Therapeutic exercise  Upright bike L3 x 5 min   BL calf stretch x 1 min (2 directions)  HR/TR on 1/2 foam 2x10   L reverse step up, step down 2x10, 6 ''   Wall slides 2x10   L DF mobs on step stool 5 sec x5     Neuromuscular Re-education   AE L step up x10, AE L step up with R knee drive x10   AE marches x2 min   AE mod tandem x1 min, BL     Manual   Manual resisted PF/DF, INV/EV x10  ea  PROM L ankle, DF mobs     Modalities      Assessment   Pt overall tolerated session well with no complaints of ankle pain.   Pt challenged with AE and balance activities but no LOB.   Required cues to decrease lateral leaning to R to compensate for L weakness during wall slides.   No complaints post tx.   Plan    Continue to progress POC as tolerated by patient to improve strength, mobility and overall function    UPDATED [facility] 2/5/24:  Access Code: [email or web address]  URL: [email or web address]  Date: 02/05/2024  Prepared by: Merari Mcknight    Exercises  - Standing Gastroc Stretch  - 2 x daily - 7 x weekly - 2 reps - 30 seconds hold  - Standing Soleus Stretch  - 2 x daily - 7 x weekly - 2 reps - 30 second hold  - Tandem  Stance  - 1 x daily - 7 x weekly - 2 reps - 30 seconds hold  - Heel Toe Raises with Counter Support  - 1 x daily - 7 x weekly - 3 sets - 10 reps  - Seated Ankle Dorsiflexion with Resistance  - 1 x daily - 7 x weekly - 3 sets - 10 reps  - Long Sitting Ankle Eversion with Resistance  - 1 x daily - 7 x weekly - 3 sets - 10 reps  - Long Sitting Ankle Inversion with Resistance (Mirrored)  - 1 x daily - 7 x weekly - 3 sets - 10 reps  - Long Sitting Ankle Plantar Flexion with Resistance  - 1 x daily - 7 x weekly - 3 sets - 10 reps  - Single Leg Stance  - 1 x daily - 7 x weekly - 1 sets - 30 sec-1 min  hold  - wall slide  - 1 x daily - 7 x weekly - 2-3 sets - 10 reps    Patient Education  - Lateral Ankle Sprain  Goals:  Active       PT Problem       Reduce pain at worst to 2-3/10 with all functional and recreational activity.        Start:  01/22/24    Expected End:  04/21/24            Increase ROM/flexibility to WFL to perform daily functional activities including walking, hiking, stair negotiation        Start:  01/22/24    Expected End:  04/21/24            Increase by > or = 1/2 mm grade to improve stability walking on uneven and even terrain, stair negotiation without increased pain/compensation         Start:  01/22/24    Expected End:  04/21/24            Pt to score an increase of 10 points or > on LEFS to display overall increased function.         Start:  01/22/24    Expected End:  04/21/24            Patient will demonstrate independence in home program for support of progression       Start:  01/22/24    Expected End:  04/21/24

## 2024-02-12 ENCOUNTER — APPOINTMENT (OUTPATIENT)
Dept: PHYSICAL THERAPY | Facility: CLINIC | Age: 69
End: 2024-02-12
Payer: MEDICARE

## 2024-02-19 ENCOUNTER — OFFICE VISIT (OUTPATIENT)
Dept: PRIMARY CARE | Facility: CLINIC | Age: 69
End: 2024-02-19
Payer: MEDICARE

## 2024-02-19 ENCOUNTER — HOSPITAL ENCOUNTER (OUTPATIENT)
Dept: RADIOLOGY | Facility: CLINIC | Age: 69
Discharge: HOME | End: 2024-02-19
Payer: MEDICARE

## 2024-02-19 ENCOUNTER — APPOINTMENT (OUTPATIENT)
Dept: PHYSICAL THERAPY | Facility: CLINIC | Age: 69
End: 2024-02-19
Payer: MEDICARE

## 2024-02-19 VITALS
OXYGEN SATURATION: 96 % | SYSTOLIC BLOOD PRESSURE: 166 MMHG | WEIGHT: 214 LBS | BODY MASS INDEX: 30.64 KG/M2 | DIASTOLIC BLOOD PRESSURE: 73 MMHG | HEART RATE: 73 BPM | TEMPERATURE: 98 F | HEIGHT: 70 IN

## 2024-02-19 DIAGNOSIS — G89.29 CHRONIC LEFT SHOULDER PAIN: ICD-10-CM

## 2024-02-19 DIAGNOSIS — R09.82 POST-NASAL DRAINAGE: ICD-10-CM

## 2024-02-19 DIAGNOSIS — R05.1 ACUTE COUGH: ICD-10-CM

## 2024-02-19 DIAGNOSIS — M25.512 CHRONIC LEFT SHOULDER PAIN: ICD-10-CM

## 2024-02-19 DIAGNOSIS — R09.89 CHEST CONGESTION: ICD-10-CM

## 2024-02-19 DIAGNOSIS — R05.1 ACUTE COUGH: Primary | ICD-10-CM

## 2024-02-19 PROCEDURE — 1160F RVW MEDS BY RX/DR IN RCRD: CPT | Performed by: STUDENT IN AN ORGANIZED HEALTH CARE EDUCATION/TRAINING PROGRAM

## 2024-02-19 PROCEDURE — 1036F TOBACCO NON-USER: CPT | Performed by: STUDENT IN AN ORGANIZED HEALTH CARE EDUCATION/TRAINING PROGRAM

## 2024-02-19 PROCEDURE — 71046 X-RAY EXAM CHEST 2 VIEWS: CPT

## 2024-02-19 PROCEDURE — 93000 ELECTROCARDIOGRAM COMPLETE: CPT | Performed by: STUDENT IN AN ORGANIZED HEALTH CARE EDUCATION/TRAINING PROGRAM

## 2024-02-19 PROCEDURE — 3078F DIAST BP <80 MM HG: CPT | Performed by: STUDENT IN AN ORGANIZED HEALTH CARE EDUCATION/TRAINING PROGRAM

## 2024-02-19 PROCEDURE — 3077F SYST BP >= 140 MM HG: CPT | Performed by: STUDENT IN AN ORGANIZED HEALTH CARE EDUCATION/TRAINING PROGRAM

## 2024-02-19 PROCEDURE — 1159F MED LIST DOCD IN RCRD: CPT | Performed by: STUDENT IN AN ORGANIZED HEALTH CARE EDUCATION/TRAINING PROGRAM

## 2024-02-19 PROCEDURE — 71046 X-RAY EXAM CHEST 2 VIEWS: CPT | Performed by: RADIOLOGY

## 2024-02-19 PROCEDURE — 99214 OFFICE O/P EST MOD 30 MIN: CPT | Performed by: STUDENT IN AN ORGANIZED HEALTH CARE EDUCATION/TRAINING PROGRAM

## 2024-02-19 PROCEDURE — 87637 SARSCOV2&INF A&B&RSV AMP PRB: CPT

## 2024-02-19 PROCEDURE — 1126F AMNT PAIN NOTED NONE PRSNT: CPT | Performed by: STUDENT IN AN ORGANIZED HEALTH CARE EDUCATION/TRAINING PROGRAM

## 2024-02-19 RX ORDER — DOXYCYCLINE 100 MG/1
100 CAPSULE ORAL 2 TIMES DAILY
Qty: 10 CAPSULE | Refills: 0 | Status: SHIPPED | OUTPATIENT
Start: 2024-02-19 | End: 2024-02-24

## 2024-02-19 RX ORDER — BENZONATATE 200 MG/1
200 CAPSULE ORAL 3 TIMES DAILY PRN
Qty: 42 CAPSULE | Refills: 0 | Status: SHIPPED | OUTPATIENT
Start: 2024-02-19 | End: 2024-03-20

## 2024-02-19 ASSESSMENT — ENCOUNTER SYMPTOMS
NAUSEA: 0
DIZZINESS: 0
PALPITATIONS: 0
SINUS PRESSURE: 0
LIGHT-HEADEDNESS: 0
DIARRHEA: 0
SHORTNESS OF BREATH: 0
ARTHRALGIAS: 0
FATIGUE: 0
FEVER: 0
CONSTIPATION: 0
CHILLS: 0
COUGH: 1
SINUS PAIN: 0
HEADACHES: 0
MYALGIAS: 0
ABDOMINAL PAIN: 0
RHINORRHEA: 0
VOMITING: 0

## 2024-02-19 ASSESSMENT — PATIENT HEALTH QUESTIONNAIRE - PHQ9
1. LITTLE INTEREST OR PLEASURE IN DOING THINGS: NOT AT ALL
SUM OF ALL RESPONSES TO PHQ9 QUESTIONS 1 AND 2: 0
2. FEELING DOWN, DEPRESSED OR HOPELESS: NOT AT ALL

## 2024-02-19 NOTE — PATIENT INSTRUCTIONS
We went ahead and got an EKG here in the office which was negative for any acute findings.  You can go right downstairs and get a chest x-ray as well.  We will contact you back with those results.    You can use over the counter medications as needed for some of your symptoms.  You can use brand-name Coricidin medications which are safe to take for those with elevated blood pressures.  You could also use a decongestant.  You can use some fluticasone propionate/Flonase nasal spray which may also help with some of the postnasal drainage that you are having.  I did also send a medication to help with your cough.  You can take this up to 3 times a day as needed.  I did send in a short course of an antibiotic especially since your symptoms have been lingering on.  If you wanted to wait until after the results of the swab testing as well as the results of the x-ray, you can wait and try the other medications first.    If you start having any sort of trouble breathing or chest pain or other concerning symptoms, you would need to go to the emergency department.    Thank you

## 2024-02-20 LAB
FLUAV RNA RESP QL NAA+PROBE: NOT DETECTED
FLUBV RNA RESP QL NAA+PROBE: NOT DETECTED
RSV RNA RESP QL NAA+PROBE: NOT DETECTED
SARS-COV-2 RNA RESP QL NAA+PROBE: NOT DETECTED

## 2024-02-21 ENCOUNTER — TELEPHONE (OUTPATIENT)
Dept: PRIMARY CARE | Facility: CLINIC | Age: 69
End: 2024-02-21
Payer: MEDICARE

## 2024-02-21 NOTE — TELEPHONE ENCOUNTER
Please schedule patient for [facility] wellness visit in Sept 2024. Schedule with Pearl or Dr. Girard. Please send this encounter to [facility] for lab orders once scheduled.     Thank you-  Jarvis Austin CMA  2/21/2024  Practice Supervisor  [facility]     Alert-The patient is alert, awake and responds to voice. The patient is oriented to time, place, and person. The triage nurse is able to obtain subjective information.

## 2024-02-26 ENCOUNTER — APPOINTMENT (OUTPATIENT)
Dept: PRIMARY CARE | Facility: CLINIC | Age: 69
End: 2024-02-26
Payer: MEDICARE

## 2024-02-28 ENCOUNTER — APPOINTMENT (OUTPATIENT)
Dept: PHYSICAL THERAPY | Facility: CLINIC | Age: 69
End: 2024-02-28
Payer: MEDICARE

## 2024-03-04 ENCOUNTER — APPOINTMENT (OUTPATIENT)
Dept: PHYSICAL THERAPY | Facility: CLINIC | Age: 69
End: 2024-03-04
Payer: MEDICARE

## 2024-03-11 ENCOUNTER — APPOINTMENT (OUTPATIENT)
Dept: PHYSICAL THERAPY | Facility: CLINIC | Age: 69
End: 2024-03-11
Payer: MEDICARE

## 2024-03-18 ENCOUNTER — APPOINTMENT (OUTPATIENT)
Dept: PHYSICAL THERAPY | Facility: CLINIC | Age: 69
End: 2024-03-18
Payer: MEDICARE

## 2024-04-10 ENCOUNTER — APPOINTMENT (OUTPATIENT)
Dept: PRIMARY CARE | Facility: CLINIC | Age: 69
End: 2024-04-10
Payer: MEDICARE

## 2024-05-07 ENCOUNTER — APPOINTMENT (OUTPATIENT)
Dept: SURGERY | Facility: CLINIC | Age: 69
End: 2024-05-07
Payer: MEDICARE

## 2024-05-12 NOTE — PROGRESS NOTES
Established Patient Visit    Patient presents for 6-month follow-up.  He was last seen by me for his postoperative visit on 11/7/2024.  Please see that note for details.     Patient without complaints.  No abdominal pain or GI symptoms.  No recurrent abdominal wall bulge.  The patient is not restricting his activities whatsoever.  He just went on a strenuous hike with a 30 pound backpack in [place] without issues.  His work requires heavy lifting and has had no incident.      11/7/2024.  Herman Merino presents for his postoperative visit.  On 10/19/2023 the patient underwent a laparoscopic repair of reducible umbilical and incisional hernias, with mesh.  A 20 x 15 Ventralex ST mesh was used.  Please see the operative note for details.  There was no operative specimen.     The patient's postoperative course was primarily characterized by abdominal wall pain.  This lasted at least 10 days.  He used oxycodone dispense #16 with 1 refill, and then I called him gabapentin dispense #30 p.o. 3 times daily 300 mg.  This multi multiple analgesia helped.  He is only taking an occasional Tylenol as needed at this point.  He did have some constipation for the first 4 days postoperative but this resolved.  He had some minimal bleeding from the left upper quadrant laparoscopic incision.     The patient presents with his wife.  They live in [place].  He is self-employed and does large industrial repairs.    10/3/2024:  Herman Merino is a 68 y.o. male who presents for follow-up with regard to his incisional hernia following CAT scan imaging.  The patient was seen initially by me on 9/19/2023 at my [facility] office.  Please see that initial office visit in [facility] [place] [facility]\"".  At that time, the patient was diagnosed with likely an incisional hernia from his previous laparoscopic operation.  The patient underwent a robotic-assisted, laparoscopic repair of bilateral inguinal hernias in [facility] 3 to 5 years ago.   On examination at that time, the patient was noted to have a bulge in the epigastrium adjacent to his laparoscopic scar.     CT imaging was performed of the abdomen and pelvis with IV contrast on 9/25/2023.  I personally reviewed the report and the images.  This reveals evidence of a supraumbilical midline fat-containing hernia with edema and stranding of the herniated fat.  Specifically, there is a 2 cm transverse by 1.4 cm sagittal fascial defect with a hernia sac measuring 4 x 2.4 x 3.8 containing fat.  Incidental findings include fatty infiltration of the liver colonic diverticulosis and BPH.     Of note, the patient does have paroxysmal atrial fibrillation and is on Eliquis 5 mg p.o. twice daily.  He follows with Dr. Aric Livingston from cardiology.  He also has a history of hepatitis C virus as well in addition to hypertension.     Patient lives in [place].  He is self-employed and does industrial equipment repair.     Past Medical History  He has no past medical history on file.     Surgical History  He has a past surgical history that includes Skin cancer excision and Hernia repair.     Social History  He reports that he has quit smoking. He has never been exposed to tobacco smoke. He has never used smokeless tobacco. He reports that he does not drink alcohol and does not use drugs.     Family History  Family History          Family History   Problem Relation Name Age of Onset    Diabetes Mother        Other (cardiac disorder) Father                Allergies  Bee venom protein (honey bee)     Review of Systems  Review of Systems     General: Not Present- Obesity, Cancer, HIV, MRSA, Recent Cold/Flu, Tired During the Day and VRE.  HEENT: Not Present- Migraine, Cataracts, Glaucoma, Macular Degeneration and Retinal Detachment.  Respiratory:Not Present- Asthma, Chronic Cough, Difficulty Breathing on Exertion, Difficulty Breathing at Rest, Emphysema, Frequent Bronchitis, Home CPAP/BiPAP, Home Oxygen, Pulmonary  Embolus, Pneumonia/TB, Sleep Apnea and Snoring.  Cardiovascular: Not Present- Chest Pain, Congestive Heart Failure, Heart Attack, Coronary Artery Disease, Heart Stent, High Cholesterol/Lipids, Internal Defibrillator, Mitral Valve Prolapse, Murmur, Pacemaker and Peripheral Vascular Disease.  Gastrointestinal: Not Present- Heartburn, Hepatitis, Hiatal Hernia, Jaundice, Reflux, Stomach Ulcer and IBS.  Male Genitourinary: Not Present- Enlarged Prostate, Kidney Failure, Kidney Stones, Dialysis and Urinary Tract Infection.  Musculoskeletal: Not Present- Arthritis, Back Pain and Fibromyalgia.  Neurological: Not Present- Headaches, Numbness, Tingling, Seizures, Stroke,  Shunt and Weakness.  Psychiatric: Not Present- Anxiety, Bipolar, Depression and Panic Attacks.  Endocrine: Not Present- Diabetes, Hyperthyroidism, Hypothyroidism and Low Blood Sugar.  Hematology: Not Present- Abnormal Bleeding, Anemia and Blood Clots.     Physical Exam    Abdominal / Ano-Rectal Physical Exam    General  General Appearance - Not in acute distress.  Well-developed and well-nourished.  Alert and oriented times 3.    Chest and Lung Exam  Auscultation:  Breath sounds: - Normal.  Clear and equal bilaterally.  Adventitious sounds: - No Adventitious sounds.    Cardiovascular  Auscultation: Rate and Rhythm - Regular. Heart Sounds - Normal heart sounds.  No murmurs.    Abdomen  Inspection: Inspection of the abdomen reveals - No Visible peristalsis, No Abnormal pulsations, No Paradoxical  movements and No Hernias.  Palpation/Percussion: Palpation and Percussion of the abdomen reveal - Non Tender, No Rebound tenderness, NoRigidity (guarding) and No Palpable abdominal masses.  Liver: - Normal.  Spleen: - Normal.  Auscultation: Auscultation of the abdomen reveals - Bowel sounds normal and No Abdominal bruits.  Surgical scars: Multiple laparoscopic  Patient examined standing with and without Valsalva; abdominal wall strong and intact    Inguinal and  External Genitalia    The patient was examined standing, with and without Valsalva. There is no evidence of inguinal or femoral hernia or lymphadenopathy bilaterally. The testes are descended bilaterally and symmetric, with no masses, nodules, or tenderness.    Rectal  Anorectal Exam:  not examined.       Last Recorded Vitals  Blood pressure 119/77, pulse 66, weight 97.5 kg (215 lb).     Relevant Results     Home Meds:       Current Outpatient Medications   Medication Instructions    albuterol 90 mcg/actuation inhaler 2 puffs, inhalation, Every 4 hours PRN    apixaban (ELIQUIS) 5 mg, oral, 2 times daily    betamethasone dipropionate 0.05 % cream APPLY TOPICALLY TO AFFECTED AREAS twice a day for 30 DAYS    EPINEPHrine 0.3 mg/0.3 mL injection syringe use as directed by prescriber intramuscularly if needed ONCE for 1 day    flecainide (Tambocor) 50 mg tablet 1.5 tablets, oral, 2 times daily    ketoconazole (NIZOral) 2 % cream Topical, Daily    loratadine (Claritin) 10 mg tablet 1 tablet, oral, Daily    metoprolol tartrate (LOPRESSOR) 25 mg, oral, 2 times daily    multivitamin tablet 1 tablet, oral, Daily          Assessment/Plan     Mr. Merino's clinical follow-up is satisfactory.  The repair is strong and intact.  There is no evidence of recurrent hernia.        Recommendations:     No activity restrictions whatsoever including exercise, lifting, etc.    Follow-up as needed

## 2024-05-14 ENCOUNTER — OFFICE VISIT (OUTPATIENT)
Dept: SURGERY | Facility: CLINIC | Age: 69
End: 2024-05-14
Payer: MEDICARE

## 2024-05-14 ENCOUNTER — DOCUMENTATION (OUTPATIENT)
Dept: PHYSICAL THERAPY | Facility: CLINIC | Age: 69
End: 2024-05-14

## 2024-05-14 VITALS
DIASTOLIC BLOOD PRESSURE: 85 MMHG | SYSTOLIC BLOOD PRESSURE: 140 MMHG | HEIGHT: 70 IN | RESPIRATION RATE: 19 BRPM | BODY MASS INDEX: 30.78 KG/M2 | OXYGEN SATURATION: 98 % | TEMPERATURE: 98.1 F | WEIGHT: 215 LBS | HEART RATE: 52 BPM

## 2024-05-14 DIAGNOSIS — Z87.19 HISTORY OF ABDOMINAL HERNIA: Primary | ICD-10-CM

## 2024-05-14 PROCEDURE — 3079F DIAST BP 80-89 MM HG: CPT | Performed by: SURGERY

## 2024-05-14 PROCEDURE — 99213 OFFICE O/P EST LOW 20 MIN: CPT | Performed by: SURGERY

## 2024-05-14 PROCEDURE — 1036F TOBACCO NON-USER: CPT | Performed by: SURGERY

## 2024-05-14 PROCEDURE — 1159F MED LIST DOCD IN RCRD: CPT | Performed by: SURGERY

## 2024-05-14 PROCEDURE — 1160F RVW MEDS BY RX/DR IN RCRD: CPT | Performed by: SURGERY

## 2024-05-14 PROCEDURE — 3077F SYST BP >= 140 MM HG: CPT | Performed by: SURGERY

## 2024-05-14 NOTE — PROGRESS NOTES
Physical Therapy    Discharge Summary    Name: Herman Merino  MRN: [id number]  : 1955  Date: 24    Discharge Summary: PT    Discharge Information: Date of discharge 24, Date of last visit 24, Date of evaluation 24, Number of attended visits 2, Referred by Kaur ARREDONDO, and Referred for L ankle pain     Rehab Discharge Reason: Other Pt requested to cancel all appts

## 2024-05-21 ENCOUNTER — OFFICE VISIT (OUTPATIENT)
Dept: PRIMARY CARE | Facility: CLINIC | Age: 69
End: 2024-05-21
Payer: MEDICARE

## 2024-05-21 VITALS
WEIGHT: 217 LBS | DIASTOLIC BLOOD PRESSURE: 70 MMHG | OXYGEN SATURATION: 97 % | HEART RATE: 59 BPM | BODY MASS INDEX: 31.07 KG/M2 | SYSTOLIC BLOOD PRESSURE: 102 MMHG | HEIGHT: 70 IN | TEMPERATURE: 97.1 F

## 2024-05-21 DIAGNOSIS — I10 ESSENTIAL HYPERTENSION: ICD-10-CM

## 2024-05-21 DIAGNOSIS — Z12.5 SCREENING PSA (PROSTATE SPECIFIC ANTIGEN): ICD-10-CM

## 2024-05-21 DIAGNOSIS — I48.0 PAROXYSMAL ATRIAL FIBRILLATION (MULTI): ICD-10-CM

## 2024-05-21 DIAGNOSIS — R73.09 ELEVATED GLUCOSE: ICD-10-CM

## 2024-05-21 DIAGNOSIS — R05.9 COUGH, UNSPECIFIED TYPE: Primary | ICD-10-CM

## 2024-05-21 DIAGNOSIS — Z87.891 SMOKING HISTORY: ICD-10-CM

## 2024-05-21 PROCEDURE — 1160F RVW MEDS BY RX/DR IN RCRD: CPT | Performed by: FAMILY MEDICINE

## 2024-05-21 PROCEDURE — 3074F SYST BP LT 130 MM HG: CPT | Performed by: FAMILY MEDICINE

## 2024-05-21 PROCEDURE — 3078F DIAST BP <80 MM HG: CPT | Performed by: FAMILY MEDICINE

## 2024-05-21 PROCEDURE — 1159F MED LIST DOCD IN RCRD: CPT | Performed by: FAMILY MEDICINE

## 2024-05-21 PROCEDURE — 99213 OFFICE O/P EST LOW 20 MIN: CPT | Performed by: FAMILY MEDICINE

## 2024-05-21 RX ORDER — APIXABAN 5 MG/1
5 TABLET, FILM COATED ORAL 2 TIMES DAILY
COMMUNITY
Start: 2024-04-15

## 2024-05-21 ASSESSMENT — ENCOUNTER SYMPTOMS
POLYDIPSIA: 0
COUGH: 1
OCCASIONAL FEELINGS OF UNSTEADINESS: 0
FATIGUE: 0
ABDOMINAL DISTENTION: 0
DYSURIA: 0
FACIAL SWELLING: 0
SINUS PRESSURE: 0
SORE THROAT: 0
ACTIVITY CHANGE: 0
LOSS OF SENSATION IN FEET: 0
APNEA: 0
DEPRESSION: 0
EYES NEGATIVE: 1
CHILLS: 0

## 2024-05-21 ASSESSMENT — PATIENT HEALTH QUESTIONNAIRE - PHQ9
10. IF YOU CHECKED OFF ANY PROBLEMS, HOW DIFFICULT HAVE THESE PROBLEMS MADE IT FOR YOU TO DO YOUR WORK, TAKE CARE OF THINGS AT HOME, OR GET ALONG WITH OTHER PEOPLE: NOT DIFFICULT AT ALL
1. LITTLE INTEREST OR PLEASURE IN DOING THINGS: NOT AT ALL
2. FEELING DOWN, DEPRESSED OR HOPELESS: NOT AT ALL
SUM OF ALL RESPONSES TO PHQ9 QUESTIONS 1 AND 2: 0

## 2024-05-21 NOTE — PROGRESS NOTES
"Subjective   Patient ID: Herman Merino is a 69 y.o. male who presents for Cough (Lingering for few months ; some fatigue).    Since February, patient with smoking history he quit smoking about 20 years ago but has approximately 34-pack-year smoking history he was smoking 1-1/2 pack a day since high school until 20 years ago.    He has had no troubles with chest pain at this time had chest x-ray performed in February which was unremarkable cough is nonproductive.  Does occasionally notice some reflux but nothing on a regular basis no posterior nasal drip.  No nausea no vomiting no abdominal pain no fever no chills no weight loss    20 years ago. 34 pk year,    Cough  Pertinent negatives include no chest pain, chills or sore throat.        Review of Systems   Constitutional:  Negative for activity change, chills and fatigue.   HENT: Negative.  Negative for drooling, facial swelling, nosebleeds, sinus pressure, sore throat and tinnitus.    Eyes: Negative.    Respiratory:  Positive for cough. Negative for apnea.    Cardiovascular:  Negative for chest pain and leg swelling.   Gastrointestinal:  Negative for abdominal distention.   Endocrine: Negative for cold intolerance and polydipsia.   Genitourinary:  Negative for dysuria.       Objective   /70   Pulse 59   Temp 36.2 °C (97.1 °F)   Ht 1.778 m (5' 10\")   Wt 98.4 kg (217 lb)   SpO2 97%   BMI 31.14 kg/m²   BSA Body surface area is 2.2 meters squared.      Physical Exam  Constitutional:       Appearance: Normal appearance.   HENT:      Head: Normocephalic.      Right Ear: Tympanic membrane normal.      Left Ear: Tympanic membrane normal.      Mouth/Throat:      Mouth: Mucous membranes are dry.   Cardiovascular:      Rate and Rhythm: Normal rate and regular rhythm.      Pulses: Normal pulses.      Heart sounds: Normal heart sounds.   Pulmonary:      Effort: Pulmonary effort is normal.      Breath sounds: Normal breath sounds.   Abdominal:      General: " Abdomen is flat.   Neurological:      Mental Status: He is alert.       Office Visit on 02/19/2024   Component Date Value Ref Range Status    Flu A Result 02/19/2024 Not Detected  Not Detected Final    Flu B Result 02/19/2024 Not Detected  Not Detected Final    Coronavirus 2019, PCR 02/19/2024 Not Detected  Not Detected Final    RSV PCR 02/19/2024 Not Detected  Not Detected Final   Admission on 10/19/2023, Discharged on 10/19/2023   Component Date Value Ref Range Status    Ventricular Rate 10/20/2023 56  BPM Final    Atrial Rate 10/20/2023 56  BPM Final    IL Interval 10/20/2023 174  ms Final    QRS Duration 10/20/2023 78  ms Final    QT Interval 10/20/2023 424  ms Final    QTC Calculation(Bazett) 10/20/2023 409  ms Final    P Axis 10/20/2023 58  degrees Final    R Axis 10/20/2023 33  degrees Final    T Axis 10/20/2023 38  degrees Final    QRS Count 10/20/2023 10  beats Final    Q Onset 10/20/2023 220  ms Final    P Onset 10/20/2023 133  ms Final    P Offset 10/20/2023 174  ms Final    T Offset 10/20/2023 432  ms Final    QTC Fredericia 10/20/2023 414  ms Final    Case Report 10/19/2023    Final                    Value:Surgical Pathology                                Case: [id number]                                  Authorizing Provider:  Ramone Engel MD           Collected:           10/19/2023 1235              Ordering Location:     [facility] OR Received:            10/19/2023 1453              Pathologist:           Alexandria Vernon MD                                                         Specimen:    HERNIA SAC                                                                                 FINAL DIAGNOSIS 10/19/2023    Final                    Value:This result contains rich text formatting which cannot be displayed here.      10/19/2023    Final                    Value:This result contains rich text formatting which cannot be displayed here.    Clinical History 10/19/2023    Final                     Value:This result contains rich text formatting which cannot be displayed here.    Gross Description 10/19/2023    Final                    Value:This result contains rich text formatting which cannot be displayed here.   Lab on 09/20/2023   Component Date Value Ref Range Status    WBC 09/20/2023 10.1  4.4 - 11.3 x10E9/L Final    nRBC 09/20/2023 0.0  0.0 - 0.0 /100 WBC Final    RBC 09/20/2023 5.28  4.50 - 5.90 x10E12/L Final    Hemoglobin 09/20/2023 15.6  13.5 - 17.5 g/dL Final    Hematocrit 09/20/2023 48.3  41.0 - 52.0 % Final    MCV 09/20/2023 91  80 - 100 fL Final    MCHC 09/20/2023 32.3  32.0 - 36.0 g/dL Final    Platelets 09/20/2023 295  150 - 450 x10E9/L Final    RDW 09/20/2023 13.2  11.5 - 14.5 % Final    Neutrophils % 09/20/2023 58.2  40.0 - 80.0 % Final    Immature Granulocytes %, Automated 09/20/2023 0.2  0.0 - 0.9 % Final     Immature Granulocyte Count (IG) includes promyelocytes,    myelocytes and metamyelocytes but does not include bands.   Percent differential counts (%) should be interpreted in the   context of the absolute cell counts (cells/L).    Lymphocytes % 09/20/2023 26.4  13.0 - 44.0 % Final    Monocytes % 09/20/2023 9.7  2.0 - 10.0 % Final    Eosinophils % 09/20/2023 4.9  0.0 - 6.0 % Final    Basophils % 09/20/2023 0.6  0.0 - 2.0 % Final    Neutrophils Absolute 09/20/2023 5.90  1.20 - 7.70 x10E9/L Final    Lymphocytes Absolute 09/20/2023 2.67  1.20 - 4.80 x10E9/L Final    Monocytes Absolute 09/20/2023 0.98  0.10 - 1.00 x10E9/L Final    Eosinophils Absolute 09/20/2023 0.50  0.00 - 0.70 x10E9/L Final    Basophils Absolute 09/20/2023 0.06  0.00 - 0.10 x10E9/L Final    Glucose 09/20/2023 93  74 - 99 mg/dL Final    Sodium 09/20/2023 138  136 - 145 mmol/L Final    Potassium 09/20/2023 4.8  3.5 - 5.3 mmol/L Final    Chloride 09/20/2023 99  98 - 107 mmol/L Final    Bicarbonate 09/20/2023 27  21 - 32 mmol/L Final    Anion Gap 09/20/2023 17  10 - 20 mmol/L Final    Urea Nitrogen 09/20/2023 16   6 - 23 mg/dL Final    Creatinine 09/20/2023 1.17  0.50 - 1.30 mg/dL Final    GFR MALE 09/20/2023 69  >90 mL/min/1.73m2 Final     CALCULATIONS OF ESTIMATED GFR ARE PERFORMED   USING THE 2021 CKD-EPI STUDY REFIT EQUATION   WITHOUT THE RACE VARIABLE FOR THE IDMS-TRACEABLE   CREATININE METHODS.    https://jasn.asnjournals.org/content/early/2021/09/22/ASN.9328891545    Calcium 09/20/2023 10.4  8.6 - 10.6 mg/dL Final    Albumin 09/20/2023 4.4  3.4 - 5.0 g/dL Final    Alkaline Phosphatase 09/20/2023 57  33 - 136 U/L Final    Total Protein 09/20/2023 7.6  6.4 - 8.2 g/dL Final    AST 09/20/2023 23  9 - 39 U/L Final    Total Bilirubin 09/20/2023 0.8  0.0 - 1.2 mg/dL Final    ALT (SGPT) 09/20/2023 35  10 - 52 U/L Final     Patients treated with Sulfasalazine may generate    falsely decreased results for ALT.    Cholesterol 09/20/2023 164  0 - 199 mg/dL Final    .      AGE      DESIRABLE   BORDERLINE HIGH   HIGH     0-19 Y     0 - 169       170 - 199     >/= 200    20-24 Y     0 - 189       190 - 224     >/= 225         >24 Y     0 - 199       200 - 239     >/= 240   **All ranges are based on fasting samples. Specific   therapeutic targets will vary based on patient-specific   cardiac risk.  .   Pediatric guidelines reference:Pediatrics 2011, 128(S5).   Adult guidelines reference: NCEP ATPIII Guidelines,     CLAREK 2001, 258:2486-97  .   Venipuncture immediately after or during the    administration of Metamizole may lead to falsely   low results. Testing should be performed immediately   prior to Metamizole dosing.    HDL 09/20/2023 42.1  mg/dL Final    .      AGE      VERY LOW   LOW     NORMAL    HIGH       0-19 Y       < 35   < 40     40-45     ----    20-24 Y       ----   < 40       >45     ----      >24 Y       ----   < 40     40-60      >60  .    Cholesterol/HDL Ratio 09/20/2023 3.9   Final    REF VALUES  DESIRABLE  < 3.4  HIGH RISK  > 5.0    LDL 09/20/2023 86  0 - 99 mg/dL Final    .                           NEAR       BORD      AGE      DESIRABLE  OPTIMAL    HIGH     HIGH     VERY HIGH     0-19 Y     0 - 109     ---    110-129   >/= 130     ----    20-24 Y     0 - 119     ---    120-159   >/= 160     ----      >24 Y     0 -  99   100-129  130-159   160-189     >/=190  .    VLDL 09/20/2023 36  0 - 40 mg/dL Final    Triglycerides 09/20/2023 178 (H)  0 - 149 mg/dL Final    .      AGE      DESIRABLE   BORDERLINE HIGH   HIGH     VERY HIGH   0 D-90 D    19 - 174         ----         ----        ----  91 D- 9 Y     0 -  74        75 -  99     >/= 100      ----    10-19 Y     0 -  89        90 - 129     >/= 130      ----    20-24 Y     0 - 114       115 - 149     >/= 150      ----         >24 Y     0 - 149       150 - 199    200- 499    >/= 500  .   Venipuncture immediately after or during the    administration of Metamizole may lead to falsely   low results. Testing should be performed immediately   prior to Metamizole dosing.    TSH 09/20/2023 1.52  0.44 - 3.98 mIU/L Final     TSH testing is performed using different testing    methodology at [facility] than at other    [facility]. Direct result comparisons should    only be made within the same method.    Prostate Specific Antigen,Screen 09/20/2023 0.61  0.00 - 4.00 ng/mL Final    The FDA requires that the method used for PSA assay be   reported to the physician. Values obtained with different   assay methods must not be used interchangeably. This test   was performed at [facility] using the Siemens  [employer] PSA method, which is a sandwich immunoassay using   chemiluminescence for quantitation. The assay is approved  for measurement of prostate-specific antigen (PSA) in   serum and may be used in conjunction with a digital rectal  examination in men 50 years and older as an aid in   detection of prostate cancer.   5-Alpha-reductase inhibitors (e.g. Proscar, Finasteride,   Avodart, Dutasteride and Olga) for the treatment of BPH   have been shown  to lower PSA levels by an average of 50%   after 6 months of treatment.    Hemoglobin A1C 09/20/2023 5.5  % Final         Diagnosis of Diabetes-Adults   Non-Diabetic: < or = 5.6%   Increased risk for developing diabetes: 5.7-6.4%   Diagnostic of diabetes: > or = 6.5%  .       Monitoring of Diabetes                Age (y)     Therapeutic Goal (%)   Adults:          >18           <7.0   Pediatrics:    13-18           <7.5                   7-12           <8.0                   0- 6            7.5-8.5   American Diabetes Association. Diabetes Care 33(S1), Jan 2010.    Estimated Average Glucose 09/20/2023 111  MG/DL Final   Orders Only on 09/20/2023   Component Date Value Ref Range Status    WBC 09/20/2023 10.0  4.4 - 11.3 x10E9/L Final    nRBC 09/20/2023 0.0  0.0 - 0.0 /100 WBC Final    RBC 09/20/2023 5.34  4.50 - 5.90 x10E12/L Final    Hemoglobin 09/20/2023 15.6  13.5 - 17.5 g/dL Final    Hematocrit 09/20/2023 47.7  41.0 - 52.0 % Final    MCV 09/20/2023 89  80 - 100 fL Final    MCHC 09/20/2023 32.7  32.0 - 36.0 g/dL Final    Platelets 09/20/2023 283  150 - 450 x10E9/L Final    RDW 09/20/2023 13.3  11.5 - 14.5 % Final    Glucose 09/20/2023 98  74 - 99 mg/dL Final    Sodium 09/20/2023 138  136 - 145 mmol/L Final    Potassium 09/20/2023 4.8  3.5 - 5.3 mmol/L Final    Chloride 09/20/2023 102  98 - 107 mmol/L Final    Bicarbonate 09/20/2023 29  21 - 32 mmol/L Final    Anion Gap 09/20/2023 12  10 - 20 mmol/L Final    Urea Nitrogen 09/20/2023 17  6 - 23 mg/dL Final    Creatinine 09/20/2023 1.17  0.50 - 1.30 mg/dL Final    GFR MALE 09/20/2023 69  >90 mL/min/1.73m2 Final    Comment:  CALCULATIONS OF ESTIMATED GFR ARE PERFORMED   USING THE 2021 CKD-EPI STUDY REFIT EQUATION   WITHOUT THE RACE VARIABLE FOR THE IDMS-TRACEABLE   CREATININE METHODS.    https://jasn.asnjournals.org/content/early/2021/09/22/ASN.8134111851      Calcium 09/20/2023 10.3  8.6 - 10.6 mg/dL Final    Phosphorus 09/20/2023 3.5  2.5 - 4.9 mg/dL Final     Comment:  The performance characteristics of phosphorus testing in   heparinized plasma have been validated by the individual     laboratory site where testing is performed. Testing    on heparinized plasma is not approved by the FDA;    however, such approval is not necessary.      Albumin 09/20/2023 4.4  3.4 - 5.0 g/dL Final     Current Outpatient Medications on File Prior to Visit   Medication Sig Dispense Refill    betamethasone dipropionate 0.05 % cream APPLY TOPICALLY TO AFFECTED AREAS twice a day for 30 DAYS      Eliquis 5 mg tablet Take 1 tablet (5 mg) by mouth 2 times a day.      EPINEPHrine 0.3 mg/0.3 mL injection syringe use as directed by prescriber intramuscularly if needed ONCE for 1 day      flecainide (Tambocor) 50 mg tablet Take 1.5 tablets (75 mg) by mouth 2 times a day.      ketoconazole (NIZOral) 2 % cream Apply topically once daily. 45 g 1    loratadine (Claritin) 10 mg tablet Take 1 tablet (10 mg) by mouth once daily.      metoprolol tartrate (Lopressor) 25 mg tablet Take 1 tablet (25 mg) by mouth 2 times a day.      multivitamin tablet Take 1 tablet by mouth once daily.      gabapentin (Neurontin) 300 mg capsule Take 1 capsule (300 mg) by mouth 3 times a day for 10 days. 30 capsule 0     No current facility-administered medications on file prior to visit.     No images are attached to the encounter.            Assessment/Plan   Problem List Items Addressed This Visit             ICD-10-CM    Paroxysmal atrial fibrillation (Multi) I48.0     Doing well continue follow-up with cardiology         Cough - Primary R05.9     Evaluating for cough lab studies being performed.    CT of the chest is being performed         Smoking history Z87.891     Congratulations to discontinue smoking checking CT of the chest

## 2024-05-21 NOTE — PATIENT INSTRUCTIONS
Overall doing well because of smoking history and cough going to order CT of the chest.    Also ordering lab studies for your physical exam CMP, CBC, lipids, TSH, PSA and hemoglobin A1c being performed.    Would like to have notification how the cough is doing in the next 2 weeks.  If any troubles with shortness of breath, high fever shaking chills inability eat or drink please let me know

## 2024-05-31 ENCOUNTER — APPOINTMENT (OUTPATIENT)
Dept: RADIOLOGY | Facility: CLINIC | Age: 69
End: 2024-05-31
Payer: MEDICARE

## 2024-05-31 ENCOUNTER — HOSPITAL ENCOUNTER (OUTPATIENT)
Dept: RADIOLOGY | Facility: CLINIC | Age: 69
Discharge: HOME | End: 2024-05-31
Payer: MEDICARE

## 2024-05-31 ENCOUNTER — LAB (OUTPATIENT)
Dept: LAB | Facility: LAB | Age: 69
End: 2024-05-31
Payer: MEDICARE

## 2024-05-31 DIAGNOSIS — I10 ESSENTIAL HYPERTENSION: ICD-10-CM

## 2024-05-31 DIAGNOSIS — R73.09 ELEVATED GLUCOSE: ICD-10-CM

## 2024-05-31 DIAGNOSIS — Z12.5 SCREENING PSA (PROSTATE SPECIFIC ANTIGEN): ICD-10-CM

## 2024-05-31 LAB
ALBUMIN SERPL BCP-MCNC: 4.3 G/DL (ref 3.4–5)
ALP SERPL-CCNC: 56 U/L (ref 33–136)
ALT SERPL W P-5'-P-CCNC: 27 U/L (ref 10–52)
ANION GAP SERPL CALC-SCNC: 14 MMOL/L (ref 10–20)
AST SERPL W P-5'-P-CCNC: 20 U/L (ref 9–39)
BASOPHILS # BLD AUTO: 0.08 X10*3/UL (ref 0–0.1)
BASOPHILS NFR BLD AUTO: 0.8 %
BILIRUB SERPL-MCNC: 0.7 MG/DL (ref 0–1.2)
BUN SERPL-MCNC: 18 MG/DL (ref 6–23)
CALCIUM SERPL-MCNC: 9.4 MG/DL (ref 8.6–10.6)
CHLORIDE SERPL-SCNC: 102 MMOL/L (ref 98–107)
CHOLEST SERPL-MCNC: 163 MG/DL (ref 0–199)
CHOLESTEROL/HDL RATIO: 3.6
CO2 SERPL-SCNC: 27 MMOL/L (ref 21–32)
CREAT SERPL-MCNC: 1.18 MG/DL (ref 0.5–1.3)
EGFRCR SERPLBLD CKD-EPI 2021: 68 ML/MIN/1.73M*2
EOSINOPHIL # BLD AUTO: 0.56 X10*3/UL (ref 0–0.7)
EOSINOPHIL NFR BLD AUTO: 5.8 %
ERYTHROCYTE [DISTWIDTH] IN BLOOD BY AUTOMATED COUNT: 13.4 % (ref 11.5–14.5)
EST. AVERAGE GLUCOSE BLD GHB EST-MCNC: 105 MG/DL
GLUCOSE SERPL-MCNC: 100 MG/DL (ref 74–99)
HBA1C MFR BLD: 5.3 %
HCT VFR BLD AUTO: 48.5 % (ref 41–52)
HDLC SERPL-MCNC: 45.6 MG/DL
HGB BLD-MCNC: 15.9 G/DL (ref 13.5–17.5)
IMM GRANULOCYTES # BLD AUTO: 0.03 X10*3/UL (ref 0–0.7)
IMM GRANULOCYTES NFR BLD AUTO: 0.3 % (ref 0–0.9)
LDLC SERPL CALC-MCNC: 93 MG/DL
LYMPHOCYTES # BLD AUTO: 2.71 X10*3/UL (ref 1.2–4.8)
LYMPHOCYTES NFR BLD AUTO: 28.2 %
MCH RBC QN AUTO: 29.4 PG (ref 26–34)
MCHC RBC AUTO-ENTMCNC: 32.8 G/DL (ref 32–36)
MCV RBC AUTO: 90 FL (ref 80–100)
MONOCYTES # BLD AUTO: 0.92 X10*3/UL (ref 0.1–1)
MONOCYTES NFR BLD AUTO: 9.6 %
NEUTROPHILS # BLD AUTO: 5.3 X10*3/UL (ref 1.2–7.7)
NEUTROPHILS NFR BLD AUTO: 55.3 %
NON HDL CHOLESTEROL: 117 MG/DL (ref 0–149)
NRBC BLD-RTO: 0 /100 WBCS (ref 0–0)
PLATELET # BLD AUTO: 303 X10*3/UL (ref 150–450)
POTASSIUM SERPL-SCNC: 4.8 MMOL/L (ref 3.5–5.3)
PROT SERPL-MCNC: 7.4 G/DL (ref 6.4–8.2)
PSA SERPL-MCNC: 0.69 NG/ML
RBC # BLD AUTO: 5.41 X10*6/UL (ref 4.5–5.9)
SODIUM SERPL-SCNC: 138 MMOL/L (ref 136–145)
TRIGL SERPL-MCNC: 120 MG/DL (ref 0–149)
TSH SERPL-ACNC: 1.2 MIU/L (ref 0.44–3.98)
VLDL: 24 MG/DL (ref 0–40)
WBC # BLD AUTO: 9.6 X10*3/UL (ref 4.4–11.3)

## 2024-05-31 PROCEDURE — 71250 CT THORAX DX C-: CPT | Performed by: RADIOLOGY

## 2024-05-31 PROCEDURE — 83036 HEMOGLOBIN GLYCOSYLATED A1C: CPT

## 2024-05-31 PROCEDURE — 71250 CT THORAX DX C-: CPT

## 2024-05-31 PROCEDURE — 85025 COMPLETE CBC W/AUTO DIFF WBC: CPT

## 2024-05-31 PROCEDURE — G0103 PSA SCREENING: HCPCS

## 2024-05-31 PROCEDURE — 80053 COMPREHEN METABOLIC PANEL: CPT

## 2024-05-31 PROCEDURE — 84443 ASSAY THYROID STIM HORMONE: CPT

## 2024-05-31 PROCEDURE — 36415 COLL VENOUS BLD VENIPUNCTURE: CPT

## 2024-05-31 PROCEDURE — 80061 LIPID PANEL: CPT

## 2024-06-07 ENCOUNTER — TELEPHONE (OUTPATIENT)
Dept: PRIMARY CARE | Facility: CLINIC | Age: 69
End: 2024-06-07
Payer: MEDICARE

## 2024-06-07 NOTE — TELEPHONE ENCOUNTER
Appointment Request From: Herman Merino     With Provider: Usama Girard [[facility]]     Preferred Date Range: 6/7/2024 - 6/14/2024     Preferred Times: Any Time     Reason for visit: Request an Appointment     Comments:  Hip pain.      Called and LM for pt to call back and schedule.

## 2024-06-13 ENCOUNTER — OFFICE VISIT (OUTPATIENT)
Dept: PRIMARY CARE | Facility: CLINIC | Age: 69
End: 2024-06-13
Payer: MEDICARE

## 2024-06-13 ENCOUNTER — HOSPITAL ENCOUNTER (OUTPATIENT)
Dept: RADIOLOGY | Facility: CLINIC | Age: 69
Discharge: HOME | End: 2024-06-13
Payer: MEDICARE

## 2024-06-13 VITALS
SYSTOLIC BLOOD PRESSURE: 139 MMHG | DIASTOLIC BLOOD PRESSURE: 91 MMHG | OXYGEN SATURATION: 96 % | WEIGHT: 210.13 LBS | HEART RATE: 57 BPM | BODY MASS INDEX: 30.15 KG/M2

## 2024-06-13 DIAGNOSIS — M25.551 PAIN OF RIGHT HIP: ICD-10-CM

## 2024-06-13 PROBLEM — K43.2 INCISIONAL HERNIA, WITHOUT OBSTRUCTION OR GANGRENE: Status: RESOLVED | Noted: 2023-10-03 | Resolved: 2024-06-13

## 2024-06-13 PROBLEM — Z00.00 ENCOUNTER FOR ANNUAL WELLNESS VISIT (AWV) IN MEDICARE PATIENT: Status: RESOLVED | Noted: 2023-09-07 | Resolved: 2024-06-13

## 2024-06-13 PROBLEM — R05.9 COUGH: Status: RESOLVED | Noted: 2023-03-08 | Resolved: 2024-06-13

## 2024-06-13 PROBLEM — B34.9 VIRAL ILLNESS: Status: RESOLVED | Noted: 2023-03-07 | Resolved: 2024-06-13

## 2024-06-13 PROBLEM — K43.9 ABDOMINAL WALL HERNIA: Status: RESOLVED | Noted: 2023-09-07 | Resolved: 2024-06-13

## 2024-06-13 PROCEDURE — 1159F MED LIST DOCD IN RCRD: CPT | Performed by: FAMILY MEDICINE

## 2024-06-13 PROCEDURE — 1160F RVW MEDS BY RX/DR IN RCRD: CPT | Performed by: FAMILY MEDICINE

## 2024-06-13 PROCEDURE — 1125F AMNT PAIN NOTED PAIN PRSNT: CPT | Performed by: FAMILY MEDICINE

## 2024-06-13 PROCEDURE — 1036F TOBACCO NON-USER: CPT | Performed by: FAMILY MEDICINE

## 2024-06-13 PROCEDURE — 3080F DIAST BP >= 90 MM HG: CPT | Performed by: FAMILY MEDICINE

## 2024-06-13 PROCEDURE — 73502 X-RAY EXAM HIP UNI 2-3 VIEWS: CPT | Mod: RT

## 2024-06-13 PROCEDURE — 99214 OFFICE O/P EST MOD 30 MIN: CPT | Performed by: FAMILY MEDICINE

## 2024-06-13 PROCEDURE — 73502 X-RAY EXAM HIP UNI 2-3 VIEWS: CPT | Mod: RIGHT SIDE | Performed by: RADIOLOGY

## 2024-06-13 PROCEDURE — 3075F SYST BP GE 130 - 139MM HG: CPT | Performed by: FAMILY MEDICINE

## 2024-06-13 RX ORDER — CYCLOBENZAPRINE HCL 10 MG
10 TABLET ORAL 2 TIMES DAILY PRN
Qty: 20 TABLET | Refills: 0 | Status: SHIPPED | OUTPATIENT
Start: 2024-06-13

## 2024-06-13 ASSESSMENT — ENCOUNTER SYMPTOMS
COLOR CHANGE: 0
INABILITY TO BEAR WEIGHT: 0
SINUS PAIN: 0
DIZZINESS: 0
EYE REDNESS: 0
CHILLS: 0
BLOOD IN STOOL: 0
BRUISES/BLEEDS EASILY: 0
FREQUENCY: 0
NECK PAIN: 0
JOINT SWELLING: 0
EYE DISCHARGE: 0
LOSS OF MOTION: 0
SORE THROAT: 0
WOUND: 0
NECK STIFFNESS: 0
NAUSEA: 0
POLYDIPSIA: 0
PALPITATIONS: 0
EYE ITCHING: 0
VOMITING: 0
FATIGUE: 0
SINUS PRESSURE: 0
SLEEP DISTURBANCE: 0
NERVOUS/ANXIOUS: 0
APPETITE CHANGE: 0
CONSTIPATION: 0
AGITATION: 0
HIP PAIN: 1
RHINORRHEA: 0
VOICE CHANGE: 0
WHEEZING: 0
LOSS OF SENSATION: 0
COUGH: 0
ARTHRALGIAS: 1
CHEST TIGHTNESS: 0
SHORTNESS OF BREATH: 0
BACK PAIN: 0
DIARRHEA: 0
ACTIVITY CHANGE: 0
ADENOPATHY: 0
FLANK PAIN: 0
DYSURIA: 0
DIAPHORESIS: 0
MUSCLE WEAKNESS: 0
TROUBLE SWALLOWING: 0
FEVER: 0
UNEXPECTED WEIGHT CHANGE: 0
POLYPHAGIA: 0
HEMATURIA: 0
EYE PAIN: 0
FACIAL SWELLING: 0
ABDOMINAL PAIN: 0
NUMBNESS: 0
TINGLING: 0

## 2024-06-13 ASSESSMENT — PAIN SCALES - GENERAL: PAINLEVEL: 3

## 2024-06-13 NOTE — PROGRESS NOTES
Subjective   Patient ID: Herman Merino is a 69 y.o. male who presents for Hip Pain (Right. 2 weeks).  Today he is accompanied by alone.     Hip Pain   The incident occurred more than 1 week ago. The incident occurred at home. There was no injury mechanism. The pain is present in the right hip. The quality of the pain is described as aching. The pain is at a severity of 8/10. The pain is moderate. The pain has been Improving since onset. Pertinent negatives include no inability to bear weight, loss of motion, loss of sensation, muscle weakness, numbness or tingling. He reports no foreign bodies present. Nothing aggravates the symptoms. He has tried acetaminophen (movement) for the symptoms. The treatment provided moderate relief.       Review of Systems   Constitutional:  Negative for activity change, appetite change, chills, diaphoresis, fatigue, fever and unexpected weight change.   HENT:  Negative for congestion, dental problem, drooling, ear discharge, ear pain, facial swelling, hearing loss, nosebleeds, postnasal drip, rhinorrhea, sinus pressure, sinus pain, sneezing, sore throat, tinnitus, trouble swallowing and voice change.    Eyes:  Negative for pain, discharge, redness, itching and visual disturbance.   Respiratory:  Negative for cough, chest tightness, shortness of breath and wheezing.    Cardiovascular:  Negative for chest pain, palpitations and leg swelling.   Gastrointestinal:  Negative for abdominal pain, blood in stool, constipation, diarrhea, nausea and vomiting.   Endocrine: Negative for cold intolerance, heat intolerance, polydipsia, polyphagia and polyuria.   Genitourinary:  Negative for decreased urine volume, dysuria, flank pain, frequency, hematuria and urgency.   Musculoskeletal:  Positive for arthralgias. Negative for back pain, gait problem, joint swelling, neck pain and neck stiffness.        Positive for right hip pain   Skin:  Negative for color change, pallor, rash and wound.    Neurological:  Negative for dizziness, tingling and numbness.   Hematological:  Negative for adenopathy. Does not bruise/bleed easily.   Psychiatric/Behavioral:  Negative for agitation, behavioral problems and sleep disturbance. The patient is not nervous/anxious.        Objective   BP (!) 139/91   Pulse 57   Wt 95.3 kg (210 lb 2 oz)   SpO2 96%   BMI 30.15 kg/m²   BSA: 2.17 meters squared  Growth percentiles: Facility age limit for growth %favio is 20 years. Facility age limit for growth %favio is 20 years.     Physical Exam  Vitals and nursing note reviewed.   Constitutional:       General: He is not in acute distress.     Appearance: Normal appearance. He is normal weight. He is not ill-appearing or toxic-appearing.   HENT:      Head: Normocephalic.      Right Ear: Tympanic membrane, ear canal and external ear normal. There is no impacted cerumen.      Left Ear: Tympanic membrane, ear canal and external ear normal. There is no impacted cerumen.      Nose: Nose normal. No congestion or rhinorrhea.      Mouth/Throat:      Mouth: Mucous membranes are moist.      Pharynx: Oropharynx is clear. No oropharyngeal exudate or posterior oropharyngeal erythema.   Eyes:      General: No scleral icterus.        Right eye: No discharge.         Left eye: No discharge.      Extraocular Movements: Extraocular movements intact.      Conjunctiva/sclera: Conjunctivae normal.      Pupils: Pupils are equal, round, and reactive to light.   Neck:      Vascular: No carotid bruit.   Cardiovascular:      Rate and Rhythm: Normal rate and regular rhythm.      Pulses: Normal pulses.      Heart sounds: Normal heart sounds. No murmur heard.     No gallop.   Pulmonary:      Effort: No respiratory distress.      Breath sounds: No wheezing or rhonchi.   Chest:      Chest wall: No tenderness.   Abdominal:      General: Abdomen is flat. Bowel sounds are normal.      Palpations: Abdomen is soft. There is no mass.      Tenderness: There is no  abdominal tenderness. There is no guarding or rebound.      Hernia: No hernia is present.   Musculoskeletal:         General: No swelling or tenderness. Normal range of motion.      Cervical back: Normal range of motion. No rigidity or tenderness.      Right lower leg: No edema.      Left lower leg: No edema.   Lymphadenopathy:      Cervical: No cervical adenopathy.   Skin:     General: Skin is warm and dry.      Coloration: Skin is not pale.      Findings: No bruising, erythema or rash.   Neurological:      General: No focal deficit present.      Mental Status: He is alert and oriented to person, place, and time.      Sensory: No sensory deficit.      Coordination: Coordination normal.      Gait: Gait normal.      Deep Tendon Reflexes: Reflexes normal.   Psychiatric:         Mood and Affect: Mood normal.         Behavior: Behavior normal.         Thought Content: Thought content normal.         Judgment: Judgment normal.         Assessment/Plan   Problem List Items Addressed This Visit    None  Visit Diagnoses         Codes    Pain of right hip     M25.551    Relevant Medications    cyclobenzaprine (Flexeril) 10 mg tablet    Other Relevant Orders    XR hip right with pelvis when performed 2 or 3 views          Continue home exercise program  Unable to reproduce patient's pain on exam  F/U in 6-8 weeks for hip pain  PT for the hip  Xrays of the hip  Call if worse or if no improvement

## 2024-06-20 ENCOUNTER — APPOINTMENT (OUTPATIENT)
Dept: PRIMARY CARE | Facility: CLINIC | Age: 69
End: 2024-06-20
Payer: MEDICARE

## 2024-08-20 DIAGNOSIS — Z12.5 SCREENING PSA (PROSTATE SPECIFIC ANTIGEN): ICD-10-CM

## 2024-08-20 DIAGNOSIS — I10 ESSENTIAL HYPERTENSION: ICD-10-CM

## 2024-09-03 ENCOUNTER — LAB (OUTPATIENT)
Dept: LAB | Facility: LAB | Age: 69
End: 2024-09-03
Payer: MEDICARE

## 2024-09-03 DIAGNOSIS — Z12.5 SCREENING PSA (PROSTATE SPECIFIC ANTIGEN): ICD-10-CM

## 2024-09-03 DIAGNOSIS — I10 ESSENTIAL HYPERTENSION: ICD-10-CM

## 2024-09-03 LAB
ALBUMIN SERPL BCP-MCNC: 4 G/DL (ref 3.4–5)
ALP SERPL-CCNC: 53 U/L (ref 33–136)
ALT SERPL W P-5'-P-CCNC: 25 U/L (ref 10–52)
ANION GAP SERPL CALC-SCNC: 12 MMOL/L (ref 10–20)
AST SERPL W P-5'-P-CCNC: 19 U/L (ref 9–39)
BASOPHILS # BLD AUTO: 0.07 X10*3/UL (ref 0–0.1)
BASOPHILS NFR BLD AUTO: 0.8 %
BILIRUB SERPL-MCNC: 0.7 MG/DL (ref 0–1.2)
BUN SERPL-MCNC: 18 MG/DL (ref 6–23)
CALCIUM SERPL-MCNC: 9.6 MG/DL (ref 8.6–10.6)
CHLORIDE SERPL-SCNC: 105 MMOL/L (ref 98–107)
CHOLEST SERPL-MCNC: 130 MG/DL (ref 0–199)
CHOLESTEROL/HDL RATIO: 2.8
CO2 SERPL-SCNC: 28 MMOL/L (ref 21–32)
CREAT SERPL-MCNC: 1.13 MG/DL (ref 0.5–1.3)
EGFRCR SERPLBLD CKD-EPI 2021: 71 ML/MIN/1.73M*2
EOSINOPHIL # BLD AUTO: 0.49 X10*3/UL (ref 0–0.7)
EOSINOPHIL NFR BLD AUTO: 5.6 %
ERYTHROCYTE [DISTWIDTH] IN BLOOD BY AUTOMATED COUNT: 13.2 % (ref 11.5–14.5)
GLUCOSE SERPL-MCNC: 101 MG/DL (ref 74–99)
HCT VFR BLD AUTO: 44.4 % (ref 41–52)
HDLC SERPL-MCNC: 46.1 MG/DL
HGB BLD-MCNC: 14.8 G/DL (ref 13.5–17.5)
IMM GRANULOCYTES # BLD AUTO: 0.03 X10*3/UL (ref 0–0.7)
IMM GRANULOCYTES NFR BLD AUTO: 0.3 % (ref 0–0.9)
LDLC SERPL CALC-MCNC: 63 MG/DL
LYMPHOCYTES # BLD AUTO: 2.52 X10*3/UL (ref 1.2–4.8)
LYMPHOCYTES NFR BLD AUTO: 28.8 %
MCH RBC QN AUTO: 29.5 PG (ref 26–34)
MCHC RBC AUTO-ENTMCNC: 33.3 G/DL (ref 32–36)
MCV RBC AUTO: 88 FL (ref 80–100)
MONOCYTES # BLD AUTO: 0.82 X10*3/UL (ref 0.1–1)
MONOCYTES NFR BLD AUTO: 9.4 %
NEUTROPHILS # BLD AUTO: 4.83 X10*3/UL (ref 1.2–7.7)
NEUTROPHILS NFR BLD AUTO: 55.1 %
NON HDL CHOLESTEROL: 84 MG/DL (ref 0–149)
NRBC BLD-RTO: 0 /100 WBCS (ref 0–0)
PLATELET # BLD AUTO: 245 X10*3/UL (ref 150–450)
POTASSIUM SERPL-SCNC: 4.7 MMOL/L (ref 3.5–5.3)
PROT SERPL-MCNC: 7 G/DL (ref 6.4–8.2)
PSA SERPL-MCNC: 0.64 NG/ML
RBC # BLD AUTO: 5.02 X10*6/UL (ref 4.5–5.9)
SODIUM SERPL-SCNC: 140 MMOL/L (ref 136–145)
TRIGL SERPL-MCNC: 103 MG/DL (ref 0–149)
TSH SERPL-ACNC: 1.58 MIU/L (ref 0.44–3.98)
VLDL: 21 MG/DL (ref 0–40)
WBC # BLD AUTO: 8.8 X10*3/UL (ref 4.4–11.3)

## 2024-09-03 PROCEDURE — 80061 LIPID PANEL: CPT

## 2024-09-03 PROCEDURE — 85025 COMPLETE CBC W/AUTO DIFF WBC: CPT

## 2024-09-03 PROCEDURE — 80053 COMPREHEN METABOLIC PANEL: CPT

## 2024-09-03 PROCEDURE — 84443 ASSAY THYROID STIM HORMONE: CPT

## 2024-09-03 PROCEDURE — G0103 PSA SCREENING: HCPCS

## 2024-09-09 ENCOUNTER — APPOINTMENT (OUTPATIENT)
Dept: PRIMARY CARE | Facility: CLINIC | Age: 69
End: 2024-09-09
Payer: MEDICARE

## 2024-09-09 VITALS
BODY MASS INDEX: 30.06 KG/M2 | TEMPERATURE: 97 F | OXYGEN SATURATION: 98 % | WEIGHT: 210 LBS | HEIGHT: 70 IN | DIASTOLIC BLOOD PRESSURE: 76 MMHG | HEART RATE: 55 BPM | SYSTOLIC BLOOD PRESSURE: 112 MMHG

## 2024-09-09 DIAGNOSIS — Z87.891 SMOKING HISTORY: ICD-10-CM

## 2024-09-09 DIAGNOSIS — I10 ESSENTIAL HYPERTENSION: ICD-10-CM

## 2024-09-09 DIAGNOSIS — Z28.21 VACCINATION NOT CARRIED OUT BECAUSE OF PATIENT REFUSAL: ICD-10-CM

## 2024-09-09 DIAGNOSIS — Z28.21 REFUSED STREPTOCOCCUS PNEUMONIAE VACCINATION: ICD-10-CM

## 2024-09-09 DIAGNOSIS — H81.10 BENIGN PAROXYSMAL POSITIONAL VERTIGO, UNSPECIFIED LATERALITY: ICD-10-CM

## 2024-09-09 DIAGNOSIS — Z00.00 ENCOUNTER FOR ANNUAL WELLNESS VISIT (AWV) IN MEDICARE PATIENT: Primary | ICD-10-CM

## 2024-09-09 DIAGNOSIS — I70.90 ATHEROSCLEROSIS: ICD-10-CM

## 2024-09-09 DIAGNOSIS — Z00.00 ROUTINE GENERAL MEDICAL EXAMINATION AT HEALTH CARE FACILITY: ICD-10-CM

## 2024-09-09 DIAGNOSIS — Z71.89 CARDIAC RISK COUNSELING: ICD-10-CM

## 2024-09-09 DIAGNOSIS — E78.5 HYPERLIPIDEMIA, UNSPECIFIED HYPERLIPIDEMIA TYPE: ICD-10-CM

## 2024-09-09 DIAGNOSIS — R05.9 COUGH, UNSPECIFIED TYPE: ICD-10-CM

## 2024-09-09 DIAGNOSIS — Z71.89 ADVANCE DIRECTIVE DISCUSSED WITH PATIENT: ICD-10-CM

## 2024-09-09 DIAGNOSIS — I48.0 PAROXYSMAL ATRIAL FIBRILLATION (MULTI): ICD-10-CM

## 2024-09-09 PROBLEM — M25.551 PAIN OF RIGHT HIP: Status: RESOLVED | Noted: 2024-06-13 | Resolved: 2024-09-09

## 2024-09-09 PROCEDURE — 1123F ACP DISCUSS/DSCN MKR DOCD: CPT | Performed by: FAMILY MEDICINE

## 2024-09-09 PROCEDURE — 99497 ADVNCD CARE PLAN 30 MIN: CPT | Performed by: FAMILY MEDICINE

## 2024-09-09 PROCEDURE — G0439 PPPS, SUBSEQ VISIT: HCPCS | Performed by: FAMILY MEDICINE

## 2024-09-09 PROCEDURE — 1159F MED LIST DOCD IN RCRD: CPT | Performed by: FAMILY MEDICINE

## 2024-09-09 PROCEDURE — G0446 INTENS BEHAVE THER CARDIO DX: HCPCS | Performed by: FAMILY MEDICINE

## 2024-09-09 PROCEDURE — 1170F FXNL STATUS ASSESSED: CPT | Performed by: FAMILY MEDICINE

## 2024-09-09 PROCEDURE — 99214 OFFICE O/P EST MOD 30 MIN: CPT | Performed by: FAMILY MEDICINE

## 2024-09-09 PROCEDURE — 3008F BODY MASS INDEX DOCD: CPT | Performed by: FAMILY MEDICINE

## 2024-09-09 PROCEDURE — 3078F DIAST BP <80 MM HG: CPT | Performed by: FAMILY MEDICINE

## 2024-09-09 PROCEDURE — 3074F SYST BP LT 130 MM HG: CPT | Performed by: FAMILY MEDICINE

## 2024-09-09 PROCEDURE — 1160F RVW MEDS BY RX/DR IN RCRD: CPT | Performed by: FAMILY MEDICINE

## 2024-09-09 PROCEDURE — 1158F ADVNC CARE PLAN TLK DOCD: CPT | Performed by: FAMILY MEDICINE

## 2024-09-09 PROCEDURE — 1036F TOBACCO NON-USER: CPT | Performed by: FAMILY MEDICINE

## 2024-09-09 RX ORDER — ROSUVASTATIN CALCIUM 20 MG/1
20 TABLET, COATED ORAL
COMMUNITY
Start: 2024-06-25 | End: 2025-06-25

## 2024-09-09 ASSESSMENT — ENCOUNTER SYMPTOMS
CARDIOVASCULAR NEGATIVE: 1
DIAPHORESIS: 0
DIZZINESS: 0
ARTHRALGIAS: 1
ABDOMINAL DISTENTION: 0
DEPRESSION: 0
NERVOUS/ANXIOUS: 0
FACIAL ASYMMETRY: 0
PALPITATIONS: 0
LOSS OF SENSATION IN FEET: 0
ANAL BLEEDING: 0
SHORTNESS OF BREATH: 0
SINUS PAIN: 0
POLYDIPSIA: 0
FREQUENCY: 0
DIFFICULTY URINATING: 0
GASTROINTESTINAL NEGATIVE: 1
EYE DISCHARGE: 0
APPETITE CHANGE: 0
UNEXPECTED WEIGHT CHANGE: 0
AGITATION: 0
FLANK PAIN: 0
EYE REDNESS: 0
BRUISES/BLEEDS EASILY: 0
SEIZURES: 0
ADENOPATHY: 0
HEADACHES: 0
VOICE CHANGE: 0
TROUBLE SWALLOWING: 0
OCCASIONAL FEELINGS OF UNSTEADINESS: 0
BLOOD IN STOOL: 0
LIGHT-HEADEDNESS: 0
BACK PAIN: 0
DECREASED CONCENTRATION: 0
SLEEP DISTURBANCE: 0
EYE PAIN: 0
FATIGUE: 0
CONSTIPATION: 0
CHILLS: 0
ACTIVITY CHANGE: 0
NUMBNESS: 0
EYE ITCHING: 0
CHOKING: 0
SPEECH DIFFICULTY: 0
WHEEZING: 0
COUGH: 1
SORE THROAT: 0
MYALGIAS: 0
VOMITING: 0
DYSURIA: 0
NAUSEA: 0
STRIDOR: 0
CHEST TIGHTNESS: 0
APNEA: 0
ABDOMINAL PAIN: 0
WOUND: 0
DIARRHEA: 0
SINUS PRESSURE: 0

## 2024-09-09 ASSESSMENT — ACTIVITIES OF DAILY LIVING (ADL)
BATHING: INDEPENDENT
MANAGING_FINANCES: INDEPENDENT
DRESSING: INDEPENDENT
GROCERY_SHOPPING: INDEPENDENT
DOING_HOUSEWORK: INDEPENDENT
TAKING_MEDICATION: INDEPENDENT

## 2024-09-09 ASSESSMENT — PATIENT HEALTH QUESTIONNAIRE - PHQ9
SUM OF ALL RESPONSES TO PHQ9 QUESTIONS 1 AND 2: 0
1. LITTLE INTEREST OR PLEASURE IN DOING THINGS: NOT AT ALL
2. FEELING DOWN, DEPRESSED OR HOPELESS: NOT AT ALL
10. IF YOU CHECKED OFF ANY PROBLEMS, HOW DIFFICULT HAVE THESE PROBLEMS MADE IT FOR YOU TO DO YOUR WORK, TAKE CARE OF THINGS AT HOME, OR GET ALONG WITH OTHER PEOPLE: NOT DIFFICULT AT ALL

## 2024-09-09 NOTE — PROGRESS NOTES
Advance Care Planning Note     Discussion Date: 09/09/24   Discussion Participants: patient    The patient wishes to discuss Advance Care Planning today and the following is a brief summary of our discussion.     Patient has capacity to make their own medical decisions: Yes  Health Care Agent/Surrogate Decision Maker documented in chart: Yes    Documents on file and valid:  Advance Directive/Living Will: No   Health Care Power of : No  Other: discussed and code status updated  Full code  Communication of Medical Status/Prognosis:   good    Communication of Treatment Goals/Options:   good    Treatment Decisions  Goals of Care: survival is prioritized, if goals for quality or survival can reasonably be achieved   agree  Follow Up Plan  Discuss next year  Team Members  PCP  Time Statement: Total face to face time spent on advance care planning was 16 minutes with 16 minutes spent in counseling, including the explanation.    Murali Limon 10-year ASCVD risk score (Mica GASTON, et al., 2019) is: 11%    Values used to calculate the score:      Age: 69 years      Sex: Male      Is Non- : No      Diabetic: No      Tobacco smoker: No      Systolic Blood Pressure: 112 mmHg      Is BP treated: Yes      HDL Cholesterol: 46.1 mg/dL      Total Cholesterol: 130 mg/dL  Time spent was 10 mins reviewingSubjective   Patient ID: Herman Merino is a 69 y.o. male who presents for Medicare Annual Wellness Visit Subsequent.    Occ cough.  Dry cough.  Occasional dry cough had a small lung nodule noted on the CT no other acute abnormalities.    Going to have him see the ENT specialist.    Cough is a little bit worse in the evening.  No significant GE reflux.  States he stays up at night watching a lot of YouTube videos he is not sleeping so well he notices that the room spins and he has motion sickness if he is laying flat or going to work underneath heavy equipment send no numbness or tingling  legs or feet.    Worse in [place]         Alcohol intake: none  Caffeine intake: 2 cups a  day  Exercise: hike 3-4 miles a day    Last Colonoscopy: 1/2024  Last Pap smear: N/A  Mammogram:N/A  Last Dexa scan:N/A    Shingles vaccine: refused  TdaP vaccine:     Review of Systems   Constitutional:  Negative for activity change, appetite change, chills, diaphoresis, fatigue and unexpected weight change.   HENT:  Negative for congestion, dental problem, ear discharge, ear pain, hearing loss, mouth sores, nosebleeds, postnasal drip, sinus pressure, sinus pain, sore throat, tinnitus, trouble swallowing and voice change.    Eyes:  Negative for pain, discharge, redness, itching and visual disturbance.   Respiratory:  Positive for cough. Negative for apnea, choking, chest tightness, shortness of breath, wheezing and stridor.         Dry cough at night   Cardiovascular: Negative.  Negative for chest pain, palpitations and leg swelling.        Occ afib   Gastrointestinal: Negative.  Negative for abdominal distention, abdominal pain, anal bleeding, blood in stool, constipation, diarrhea, nausea and vomiting.   Endocrine: Negative for cold intolerance, heat intolerance, polydipsia and polyuria.   Genitourinary:  Positive for enuresis (1 time). Negative for difficulty urinating, dysuria, flank pain, frequency and penile pain.   Musculoskeletal:  Positive for arthralgias. Negative for back pain and myalgias.        Ankle pain   Skin:  Negative for rash and wound.   Allergic/Immunologic: Negative for environmental allergies, food allergies and immunocompromised state.   Neurological:  Negative for dizziness, seizures, facial asymmetry, speech difficulty, light-headedness, numbness and headaches.   Hematological:  Negative for adenopathy. Does not bruise/bleed easily.   Psychiatric/Behavioral:  Negative for agitation, behavioral problems, decreased concentration, sleep disturbance and suicidal ideas. The patient is not nervous/anxious.   "      Objective   /76   Pulse 55   Temp 36.1 °C (97 °F)   Ht 1.765 m (5' 9.5\")   Wt 95.3 kg (210 lb)   SpO2 98%   BMI 30.57 kg/m²   BSA Body surface area is 2.16 meters squared.      Physical Exam  Constitutional:       General: He is not in acute distress.     Appearance: Normal appearance. He is not ill-appearing or toxic-appearing.   HENT:      Head: Normocephalic.      Right Ear: Tympanic membrane normal.      Left Ear: Tympanic membrane normal.      Nose: Nose normal.   Eyes:      Extraocular Movements: Extraocular movements intact.      Conjunctiva/sclera: Conjunctivae normal.      Pupils: Pupils are equal, round, and reactive to light.   Cardiovascular:      Rate and Rhythm: Normal rate and regular rhythm.      Pulses: Normal pulses.      Heart sounds: Normal heart sounds.   Pulmonary:      Effort: Pulmonary effort is normal.      Breath sounds: Normal breath sounds. No wheezing or rhonchi.   Abdominal:      General: Abdomen is flat. Bowel sounds are normal. There is no distension.      Palpations: Abdomen is soft.      Tenderness: There is no abdominal tenderness. There is no right CVA tenderness or rebound.      Hernia: No hernia is present.      Comments: Abdomen feels well.  Has mesh and from hernia repair   Genitourinary:     Penis: Normal.       Testes: Normal.      Prostate: Normal.   Musculoskeletal:         General: Normal range of motion.      Cervical back: Normal range of motion.      Right lower leg: No edema.      Comments: Slight tenderness noted lateral aspect of the left ankle.  Anterior posterior drawer sign intact.    Dorsiflexion extension intact.  No redness no warmth   Skin:     General: Skin is warm and dry.      Capillary Refill: Capillary refill takes less than 2 seconds.      Findings: No bruising.   Neurological:      General: No focal deficit present.      Mental Status: He is alert and oriented to person, place, and time.      Cranial Nerves: No cranial nerve deficit. "      Sensory: No sensory deficit.      Motor: No weakness.      Coordination: Coordination normal.      Gait: Gait normal.      Deep Tendon Reflexes: Reflexes normal.   Psychiatric:         Mood and Affect: Mood normal.         Behavior: Behavior normal.       Lab on 09/03/2024   Component Date Value Ref Range Status    WBC 09/03/2024 8.8  4.4 - 11.3 x10*3/uL Final    nRBC 09/03/2024 0.0  0.0 - 0.0 /100 WBCs Final    RBC 09/03/2024 5.02  4.50 - 5.90 x10*6/uL Final    Hemoglobin 09/03/2024 14.8  13.5 - 17.5 g/dL Final    Hematocrit 09/03/2024 44.4  41.0 - 52.0 % Final    MCV 09/03/2024 88  80 - 100 fL Final    MCH 09/03/2024 29.5  26.0 - 34.0 pg Final    MCHC 09/03/2024 33.3  32.0 - 36.0 g/dL Final    RDW 09/03/2024 13.2  11.5 - 14.5 % Final    Platelets 09/03/2024 245  150 - 450 x10*3/uL Final    Neutrophils % 09/03/2024 55.1  40.0 - 80.0 % Final    Immature Granulocytes %, Automated 09/03/2024 0.3  0.0 - 0.9 % Final    Immature Granulocyte Count (IG) includes promyelocytes, myelocytes and metamyelocytes but does not include bands. Percent differential counts (%) should be interpreted in the context of the absolute cell counts (cells/UL).    Lymphocytes % 09/03/2024 28.8  13.0 - 44.0 % Final    Monocytes % 09/03/2024 9.4  2.0 - 10.0 % Final    Eosinophils % 09/03/2024 5.6  0.0 - 6.0 % Final    Basophils % 09/03/2024 0.8  0.0 - 2.0 % Final    Neutrophils Absolute 09/03/2024 4.83  1.20 - 7.70 x10*3/uL Final    Percent differential counts (%) should be interpreted in the context of the absolute cell counts (cells/uL).    Immature Granulocytes Absolute, Au* 09/03/2024 0.03  0.00 - 0.70 x10*3/uL Final    Lymphocytes Absolute 09/03/2024 2.52  1.20 - 4.80 x10*3/uL Final    Monocytes Absolute 09/03/2024 0.82  0.10 - 1.00 x10*3/uL Final    Eosinophils Absolute 09/03/2024 0.49  0.00 - 0.70 x10*3/uL Final    Basophils Absolute 09/03/2024 0.07  0.00 - 0.10 x10*3/uL Final    Glucose 09/03/2024 101 (H)  74 - 99 mg/dL Final     Sodium 09/03/2024 140  136 - 145 mmol/L Final    Potassium 09/03/2024 4.7  3.5 - 5.3 mmol/L Final    Chloride 09/03/2024 105  98 - 107 mmol/L Final    Bicarbonate 09/03/2024 28  21 - 32 mmol/L Final    Anion Gap 09/03/2024 12  10 - 20 mmol/L Final    Urea Nitrogen 09/03/2024 18  6 - 23 mg/dL Final    Creatinine 09/03/2024 1.13  0.50 - 1.30 mg/dL Final    eGFR 09/03/2024 71  >60 mL/min/1.73m*2 Final    Calculations of estimated GFR are performed using the 2021 CKD-EPI Study Refit equation without the race variable for the IDMS-Traceable creatinine methods.  https://jasn.asnjournals.org/content/early/2021/09/22/ASN.2226854025    Calcium 09/03/2024 9.6  8.6 - 10.6 mg/dL Final    Albumin 09/03/2024 4.0  3.4 - 5.0 g/dL Final    Alkaline Phosphatase 09/03/2024 53  33 - 136 U/L Final    Total Protein 09/03/2024 7.0  6.4 - 8.2 g/dL Final    AST 09/03/2024 19  9 - 39 U/L Final    Bilirubin, Total 09/03/2024 0.7  0.0 - 1.2 mg/dL Final    ALT 09/03/2024 25  10 - 52 U/L Final    Patients treated with Sulfasalazine may generate falsely decreased results for ALT.    Cholesterol 09/03/2024 130  0 - 199 mg/dL Final          Age      Desirable   Borderline High   High     0-19 Y     0 - 169       170 - 199     >/= 200    20-24 Y     0 - 189       190 - 224     >/= 225         >24 Y     0 - 199       200 - 239     >/= 240   **All ranges are based on fasting samples. Specific   therapeutic targets will vary based on patient-specific   cardiac risk.    Pediatric guidelines reference:Pediatrics 2011, 128(S5).Adult guidelines reference: NCEP ATPIII Guidelines,CLARKE 2001, 258:2486-97    Venipuncture immediately after or during the administration of Metamizole may lead to falsely low results. Testing should be performed immediately prior to Metamizole dosing.    HDL-Cholesterol 09/03/2024 46.1  mg/dL Final      Age       Very Low   Low     Normal    High    0-19 Y    < 35      < 40     40-45     ----  20-24 Y    ----     < 40      >45       ----        >24 Y      ----     < 40     40-60      >60      Cholesterol/HDL Ratio 09/03/2024 2.8   Final      Ref Values  Desirable  < 3.4  High Risk  > 5.0    LDL Calculated 09/03/2024 63  <=99 mg/dL Final                                Near   Borderline      AGE      Desirable  Optimal    High     High     Very High     0-19 Y     0 - 109     ---    110-129   >/= 130     ----    20-24 Y     0 - 119     ---    120-159   >/= 160     ----      >24 Y     0 -  99   100-129  130-159   160-189     >/=190      VLDL 09/03/2024 21  0 - 40 mg/dL Final    Triglycerides 09/03/2024 103  0 - 149 mg/dL Final       Age         Desirable   Borderline High   High     Very High   0 D-90 D    19 - 174         ----         ----        ----  91 D- 9 Y     0 -  74        75 -  99     >/= 100      ----    10-19 Y     0 -  89        90 - 129     >/= 130      ----    20-24 Y     0 - 114       115 - 149     >/= 150      ----         >24 Y     0 - 149       150 - 199    200- 499    >/= 500    Venipuncture immediately after or during the administration of Metamizole may lead to falsely low results. Testing should be performed immediately prior to Metamizole dosing.    Non HDL Cholesterol 09/03/2024 84  0 - 149 mg/dL Final          Age       Desirable   Borderline High   High     Very High     0-19 Y     0 - 119       120 - 144     >/= 145    >/= 160    20-24 Y     0 - 149       150 - 189     >/= 190      ----         >24 Y    30 mg/dL above LDL Cholesterol goal      Thyroid Stimulating Hormone 09/03/2024 1.58  0.44 - 3.98 mIU/L Final    Prostate Specific Antigen,Screen 09/03/2024 0.64  <=4.00 ng/mL Final   Lab on 05/31/2024   Component Date Value Ref Range Status    WBC 05/31/2024 9.6  4.4 - 11.3 x10*3/uL Final    nRBC 05/31/2024 0.0  0.0 - 0.0 /100 WBCs Final    RBC 05/31/2024 5.41  4.50 - 5.90 x10*6/uL Final    Hemoglobin 05/31/2024 15.9  13.5 - 17.5 g/dL Final    Hematocrit 05/31/2024 48.5  41.0 - 52.0 % Final    MCV 05/31/2024 90  80 -  100 fL Final    MCH 05/31/2024 29.4  26.0 - 34.0 pg Final    MCHC 05/31/2024 32.8  32.0 - 36.0 g/dL Final    RDW 05/31/2024 13.4  11.5 - 14.5 % Final    Platelets 05/31/2024 303  150 - 450 x10*3/uL Final    Neutrophils % 05/31/2024 55.3  40.0 - 80.0 % Final    Immature Granulocytes %, Automated 05/31/2024 0.3  0.0 - 0.9 % Final    Immature Granulocyte Count (IG) includes promyelocytes, myelocytes and metamyelocytes but does not include bands. Percent differential counts (%) should be interpreted in the context of the absolute cell counts (cells/UL).    Lymphocytes % 05/31/2024 28.2  13.0 - 44.0 % Final    Monocytes % 05/31/2024 9.6  2.0 - 10.0 % Final    Eosinophils % 05/31/2024 5.8  0.0 - 6.0 % Final    Basophils % 05/31/2024 0.8  0.0 - 2.0 % Final    Neutrophils Absolute 05/31/2024 5.30  1.20 - 7.70 x10*3/uL Final    Percent differential counts (%) should be interpreted in the context of the absolute cell counts (cells/uL).    Immature Granulocytes Absolute, Au* 05/31/2024 0.03  0.00 - 0.70 x10*3/uL Final    Lymphocytes Absolute 05/31/2024 2.71  1.20 - 4.80 x10*3/uL Final    Monocytes Absolute 05/31/2024 0.92  0.10 - 1.00 x10*3/uL Final    Eosinophils Absolute 05/31/2024 0.56  0.00 - 0.70 x10*3/uL Final    Basophils Absolute 05/31/2024 0.08  0.00 - 0.10 x10*3/uL Final    Glucose 05/31/2024 100 (H)  74 - 99 mg/dL Final    Sodium 05/31/2024 138  136 - 145 mmol/L Final    Potassium 05/31/2024 4.8  3.5 - 5.3 mmol/L Final    Chloride 05/31/2024 102  98 - 107 mmol/L Final    Bicarbonate 05/31/2024 27  21 - 32 mmol/L Final    Anion Gap 05/31/2024 14  10 - 20 mmol/L Final    Urea Nitrogen 05/31/2024 18  6 - 23 mg/dL Final    Creatinine 05/31/2024 1.18  0.50 - 1.30 mg/dL Final    eGFR 05/31/2024 68  >60 mL/min/1.73m*2 Final    Calculations of estimated GFR are performed using the 2021 CKD-EPI Study Refit equation without the race variable for the IDMS-Traceable creatinine  methods.  https://jasn.asnjournals.org/content/early/2021/09/22/ASN.1210581856    Calcium 05/31/2024 9.4  8.6 - 10.6 mg/dL Final    Albumin 05/31/2024 4.3  3.4 - 5.0 g/dL Final    Alkaline Phosphatase 05/31/2024 56  33 - 136 U/L Final    Total Protein 05/31/2024 7.4  6.4 - 8.2 g/dL Final    AST 05/31/2024 20  9 - 39 U/L Final    Bilirubin, Total 05/31/2024 0.7  0.0 - 1.2 mg/dL Final    ALT 05/31/2024 27  10 - 52 U/L Final    Patients treated with Sulfasalazine may generate falsely decreased results for ALT.    Cholesterol 05/31/2024 163  0 - 199 mg/dL Final          Age      Desirable   Borderline High   High     0-19 Y     0 - 169       170 - 199     >/= 200    20-24 Y     0 - 189       190 - 224     >/= 225         >24 Y     0 - 199       200 - 239     >/= 240   **All ranges are based on fasting samples. Specific   therapeutic targets will vary based on patient-specific   cardiac risk.    Pediatric guidelines reference:Pediatrics 2011, 128(S5).Adult guidelines reference: NCEP ATPIII Guidelines,CLARKE 2001, 258:2486-97    Venipuncture immediately after or during the administration of Metamizole may lead to falsely low results. Testing should be performed immediately prior to Metamizole dosing.    HDL-Cholesterol 05/31/2024 45.6  mg/dL Final      Age       Very Low   Low     Normal    High    0-19 Y    < 35      < 40     40-45     ----  20-24 Y    ----     < 40      >45      ----        >24 Y      ----     < 40     40-60      >60      Cholesterol/HDL Ratio 05/31/2024 3.6   Final      Ref Values  Desirable  < 3.4  High Risk  > 5.0    LDL Calculated 05/31/2024 93  <=99 mg/dL Final                                Near   Borderline      AGE      Desirable  Optimal    High     High     Very High     0-19 Y     0 - 109     ---    110-129   >/= 130     ----    20-24 Y     0 - 119     ---    120-159   >/= 160     ----      >24 Y     0 -  99   100-129  130-159   160-189     >/=190      VLDL 05/31/2024 24  0 - 40 mg/dL Final     Triglycerides 05/31/2024 120  0 - 149 mg/dL Final       Age         Desirable   Borderline High   High     Very High   0 D-90 D    19 - 174         ----         ----        ----  91 D- 9 Y     0 -  74        75 -  99     >/= 100      ----    10-19 Y     0 -  89        90 - 129     >/= 130      ----    20-24 Y     0 - 114       115 - 149     >/= 150      ----         >24 Y     0 - 149       150 - 199    200- 499    >/= 500    Venipuncture immediately after or during the administration of Metamizole may lead to falsely low results. Testing should be performed immediately prior to Metamizole dosing.    Non HDL Cholesterol 05/31/2024 117  0 - 149 mg/dL Final          Age       Desirable   Borderline High   High     Very High     0-19 Y     0 - 119       120 - 144     >/= 145    >/= 160    20-24 Y     0 - 149       150 - 189     >/= 190      ----         >24 Y    30 mg/dL above LDL Cholesterol goal      Thyroid Stimulating Hormone 05/31/2024 1.20  0.44 - 3.98 mIU/L Final    Prostate Specific Antigen,Screen 05/31/2024 0.69  <=4.00 ng/mL Final    Hemoglobin A1C 05/31/2024 5.3  see below % Final    Estimated Average Glucose 05/31/2024 105  Not Established mg/dL Final   Office Visit on 02/19/2024   Component Date Value Ref Range Status    Flu A Result 02/19/2024 Not Detected  Not Detected Final    Flu B Result 02/19/2024 Not Detected  Not Detected Final    Coronavirus 2019, PCR 02/19/2024 Not Detected  Not Detected Final    RSV PCR 02/19/2024 Not Detected  Not Detected Final   Admission on 10/19/2023, Discharged on 10/19/2023   Component Date Value Ref Range Status    Ventricular Rate 10/20/2023 56  BPM Final    Atrial Rate 10/20/2023 56  BPM Final    AR Interval 10/20/2023 174  ms Final    QRS Duration 10/20/2023 78  ms Final    QT Interval 10/20/2023 424  ms Final    QTC Calculation(Bazett) 10/20/2023 409  ms Final    P Axis 10/20/2023 58  degrees Final    R Axis 10/20/2023 33  degrees Final    T Axis 10/20/2023 38   "degrees Final    QRS Count 10/20/2023 10  beats Final    Q Onset 10/20/2023 220  ms Final    P Onset 10/20/2023 133  ms Final    P Offset 10/20/2023 174  ms Final    T Offset 10/20/2023 432  ms Final    QTC Fredericia 10/20/2023 414  ms Final    Case Report 10/19/2023    Final                    Value:Surgical Pathology                                Case: [id number]                                  Authorizing Provider:  Ramone Engel MD           Collected:           10/19/2023 1235              Ordering Location:     [facility] OR Received:            10/19/2023 1453              Pathologist:           Alexandria Vernon MD                                                         Specimen:    HERNIA SAC                                                                                 FINAL DIAGNOSIS 10/19/2023    Final                    Value:A. HERNIA REPAIR:                          -- HERNIA SAC.      10/19/2023    Final                    Value:By the signature on this report, the individual or group listed as making                           the Final Interpretation/Diagnosis certifies that they have reviewed this                           case.     Clinical History 10/19/2023    Final                    Value:Pre-op diagnosis:                          Incisional hernia, without obstruction or gangrene [K43.2]    Gross Description 10/19/2023    Final                    Value:A: Received in formalin, labeled with the patient's name and hospital                           number and \"hernia sac\", are multiple membranous segments of tissue                           aggregating to 13.5 x 6.5 x 1.5 cm.  Areas of nodularity, hemorrhage or                           necrosis are not seen.  Areas of possible firmness are identified and                           measure 1.5 x 0.7 x 0.2 cm in greatest dimension.  Representative sections                           are submitted in one cassette.                   "        P   Lab on 09/20/2023   Component Date Value Ref Range Status    WBC 09/20/2023 10.1  4.4 - 11.3 x10E9/L Final    nRBC 09/20/2023 0.0  0.0 - 0.0 /100 WBC Final    RBC 09/20/2023 5.28  4.50 - 5.90 x10E12/L Final    Hemoglobin 09/20/2023 15.6  13.5 - 17.5 g/dL Final    Hematocrit 09/20/2023 48.3  41.0 - 52.0 % Final    MCV 09/20/2023 91  80 - 100 fL Final    MCHC 09/20/2023 32.3  32.0 - 36.0 g/dL Final    Platelets 09/20/2023 295  150 - 450 x10E9/L Final    RDW 09/20/2023 13.2  11.5 - 14.5 % Final    Neutrophils % 09/20/2023 58.2  40.0 - 80.0 % Final    Immature Granulocytes %, Automated 09/20/2023 0.2  0.0 - 0.9 % Final     Immature Granulocyte Count (IG) includes promyelocytes,    myelocytes and metamyelocytes but does not include bands.   Percent differential counts (%) should be interpreted in the   context of the absolute cell counts (cells/L).    Lymphocytes % 09/20/2023 26.4  13.0 - 44.0 % Final    Monocytes % 09/20/2023 9.7  2.0 - 10.0 % Final    Eosinophils % 09/20/2023 4.9  0.0 - 6.0 % Final    Basophils % 09/20/2023 0.6  0.0 - 2.0 % Final    Neutrophils Absolute 09/20/2023 5.90  1.20 - 7.70 x10E9/L Final    Lymphocytes Absolute 09/20/2023 2.67  1.20 - 4.80 x10E9/L Final    Monocytes Absolute 09/20/2023 0.98  0.10 - 1.00 x10E9/L Final    Eosinophils Absolute 09/20/2023 0.50  0.00 - 0.70 x10E9/L Final    Basophils Absolute 09/20/2023 0.06  0.00 - 0.10 x10E9/L Final    Glucose 09/20/2023 93  74 - 99 mg/dL Final    Sodium 09/20/2023 138  136 - 145 mmol/L Final    Potassium 09/20/2023 4.8  3.5 - 5.3 mmol/L Final    Chloride 09/20/2023 99  98 - 107 mmol/L Final    Bicarbonate 09/20/2023 27  21 - 32 mmol/L Final    Anion Gap 09/20/2023 17  10 - 20 mmol/L Final    Urea Nitrogen 09/20/2023 16  6 - 23 mg/dL Final    Creatinine 09/20/2023 1.17  0.50 - 1.30 mg/dL Final    GFR MALE 09/20/2023 69  >90 mL/min/1.73m2 Final     CALCULATIONS OF ESTIMATED GFR ARE PERFORMED   USING THE 2021 CKD-EPI STUDY REFIT  EQUATION   WITHOUT THE RACE VARIABLE FOR THE IDMS-TRACEABLE   CREATININE METHODS.    https://jasn.asnjournals.org/content/early/2021/09/22/ASN.9032814377    Calcium 09/20/2023 10.4  8.6 - 10.6 mg/dL Final    Albumin 09/20/2023 4.4  3.4 - 5.0 g/dL Final    Alkaline Phosphatase 09/20/2023 57  33 - 136 U/L Final    Total Protein 09/20/2023 7.6  6.4 - 8.2 g/dL Final    AST 09/20/2023 23  9 - 39 U/L Final    Total Bilirubin 09/20/2023 0.8  0.0 - 1.2 mg/dL Final    ALT (SGPT) 09/20/2023 35  10 - 52 U/L Final     Patients treated with Sulfasalazine may generate    falsely decreased results for ALT.    Cholesterol 09/20/2023 164  0 - 199 mg/dL Final    .      AGE      DESIRABLE   BORDERLINE HIGH   HIGH     0-19 Y     0 - 169       170 - 199     >/= 200    20-24 Y     0 - 189       190 - 224     >/= 225         >24 Y     0 - 199       200 - 239     >/= 240   **All ranges are based on fasting samples. Specific   therapeutic targets will vary based on patient-specific   cardiac risk.  .   Pediatric guidelines reference:Pediatrics 2011, 128(S5).   Adult guidelines reference: NCEP ATPIII Guidelines,     CLARKE 2001, 258:2486-97  .   Venipuncture immediately after or during the    administration of Metamizole may lead to falsely   low results. Testing should be performed immediately   prior to Metamizole dosing.    HDL 09/20/2023 42.1  mg/dL Final    .      AGE      VERY LOW   LOW     NORMAL    HIGH       0-19 Y       < 35   < 40     40-45     ----    20-24 Y       ----   < 40       >45     ----      >24 Y       ----   < 40     40-60      >60  .    Cholesterol/HDL Ratio 09/20/2023 3.9   Final    REF VALUES  DESIRABLE  < 3.4  HIGH RISK  > 5.0    LDL 09/20/2023 86  0 - 99 mg/dL Final    .                           NEAR      BORD      AGE      DESIRABLE  OPTIMAL    HIGH     HIGH     VERY HIGH     0-19 Y     0 - 109     ---    110-129   >/= 130     ----    20-24 Y     0 - 119     ---    120-159   >/= 160     ----      >24 Y     0 -  99    100-129  130-159   160-189     >/=190  .    VLDL 09/20/2023 36  0 - 40 mg/dL Final    Triglycerides 09/20/2023 178 (H)  0 - 149 mg/dL Final    .      AGE      DESIRABLE   BORDERLINE HIGH   HIGH     VERY HIGH   0 D-90 D    19 - 174         ----         ----        ----  91 D- 9 Y     0 -  74        75 -  99     >/= 100      ----    10-19 Y     0 -  89        90 - 129     >/= 130      ----    20-24 Y     0 - 114       115 - 149     >/= 150      ----         >24 Y     0 - 149       150 - 199    200- 499    >/= 500  .   Venipuncture immediately after or during the    administration of Metamizole may lead to falsely   low results. Testing should be performed immediately   prior to Metamizole dosing.    TSH 09/20/2023 1.52  0.44 - 3.98 mIU/L Final     TSH testing is performed using different testing    methodology at [facility] than at other    [facility]. Direct result comparisons should    only be made within the same method.    Prostate Specific Antigen,Screen 09/20/2023 0.61  0.00 - 4.00 ng/mL Final    The FDA requires that the method used for PSA assay be   reported to the physician. Values obtained with different   assay methods must not be used interchangeably. This test   was performed at [facility] using the [employer] PSA method, which is a sandwich immunoassay using   chemiluminescence for quantitation. The assay is approved  for measurement of prostate-specific antigen (PSA) in   serum and may be used in conjunction with a digital rectal  examination in men 50 years and older as an aid in   detection of prostate cancer.   5-Alpha-reductase inhibitors (e.g. Proscar, Finasteride,   Avodart, Dutasteride and Olga) for the treatment of BPH   have been shown to lower PSA levels by an average of 50%   after 6 months of treatment.    Hemoglobin A1C 09/20/2023 5.5  % Final         Diagnosis of Diabetes-Adults   Non-Diabetic: < or = 5.6%   Increased risk for developing  diabetes: 5.7-6.4%   Diagnostic of diabetes: > or = 6.5%  .       Monitoring of Diabetes                Age (y)     Therapeutic Goal (%)   Adults:          >18           <7.0   Pediatrics:    13-18           <7.5                   7-12           <8.0                   0- 6            7.5-8.5   American Diabetes Association. Diabetes Care 33(S1), Jan 2010.    Estimated Average Glucose 09/20/2023 111  MG/DL Final   Orders Only on 09/20/2023   Component Date Value Ref Range Status    WBC 09/20/2023 10.0  4.4 - 11.3 x10E9/L Final    nRBC 09/20/2023 0.0  0.0 - 0.0 /100 WBC Final    RBC 09/20/2023 5.34  4.50 - 5.90 x10E12/L Final    Hemoglobin 09/20/2023 15.6  13.5 - 17.5 g/dL Final    Hematocrit 09/20/2023 47.7  41.0 - 52.0 % Final    MCV 09/20/2023 89  80 - 100 fL Final    MCHC 09/20/2023 32.7  32.0 - 36.0 g/dL Final    Platelets 09/20/2023 283  150 - 450 x10E9/L Final    RDW 09/20/2023 13.3  11.5 - 14.5 % Final    Glucose 09/20/2023 98  74 - 99 mg/dL Final    Sodium 09/20/2023 138  136 - 145 mmol/L Final    Potassium 09/20/2023 4.8  3.5 - 5.3 mmol/L Final    Chloride 09/20/2023 102  98 - 107 mmol/L Final    Bicarbonate 09/20/2023 29  21 - 32 mmol/L Final    Anion Gap 09/20/2023 12  10 - 20 mmol/L Final    Urea Nitrogen 09/20/2023 17  6 - 23 mg/dL Final    Creatinine 09/20/2023 1.17  0.50 - 1.30 mg/dL Final    GFR MALE 09/20/2023 69  >90 mL/min/1.73m2 Final    Comment:  CALCULATIONS OF ESTIMATED GFR ARE PERFORMED   USING THE 2021 CKD-EPI STUDY REFIT EQUATION   WITHOUT THE RACE VARIABLE FOR THE IDMS-TRACEABLE   CREATININE METHODS.    https://jasn.asnjournals.org/content/early/2021/09/22/ASN.8995867098      Calcium 09/20/2023 10.3  8.6 - 10.6 mg/dL Final    Phosphorus 09/20/2023 3.5  2.5 - 4.9 mg/dL Final    Comment:  The performance characteristics of phosphorus testing in   heparinized plasma have been validated by the individual     laboratory site where testing is performed. Testing    on heparinized plasma is not  approved by the FDA;    however, such approval is not necessary.      Albumin 09/20/2023 4.4  3.4 - 5.0 g/dL Final     Current Outpatient Medications on File Prior to Visit   Medication Sig Dispense Refill    betamethasone dipropionate 0.05 % cream APPLY TOPICALLY TO AFFECTED AREAS twice a day for 30 DAYS      cyclobenzaprine (Flexeril) 10 mg tablet Take 1 tablet (10 mg) by mouth 2 times a day as needed for muscle spasms. 20 tablet 0    Eliquis 5 mg tablet Take 1 tablet (5 mg) by mouth 2 times a day.      EPINEPHrine 0.3 mg/0.3 mL injection syringe use as directed by prescriber intramuscularly if needed ONCE for 1 day      flecainide (Tambocor) 50 mg tablet Take 1.5 tablets (75 mg) by mouth 2 times a day.      metoprolol tartrate (Lopressor) 25 mg tablet Take 1 tablet (25 mg) by mouth 2 times a day.      multivitamin tablet Take 1 tablet by mouth once daily.      rosuvastatin (Crestor) 20 mg tablet Take 1 tablet (20 mg) by mouth once daily.      [DISCONTINUED] loratadine (Claritin) 10 mg tablet Take 1 tablet (10 mg) by mouth once daily.       No current facility-administered medications on file prior to visit.     No images are attached to the encounter.            Assessment/Plan   Problem List Items Addressed This Visit             ICD-10-CM    Essential hypertension I10     Blood pressure well-controlled         Paroxysmal atrial fibrillation (Multi) I48.0     Anticoagulated with Eliquis         Refused Streptococcus pneumoniae vaccination Z28.21    Encounter for annual wellness visit (AWV) in Medicare patient - Primary Z00.00    Smoking history Z87.891     Had recent CT of the chest performed         Hyperlipidemia E78.5    Atherosclerosis I70.90     Agree with cardiology recommend statin therapy         Benign paroxysmal positional vertigo H81.10     Going to do physical therapy for benign positional vertigo          Other Visit Diagnoses         Codes    Routine general medical examination at [facility]  facility     Z00.00

## 2024-09-09 NOTE — PATIENT INSTRUCTIONS
Doing well.    Please continue to exercise on a regular basis.    Agree with statin therapy    Going to have see ENT specialist because of the troubles with persistent coughing.    Going to do physical therapy for 2 sessions regarding positional vertigo.    Medications reviewed and reconciled    Labs have been reviewed with you.    Recommend avoiding technology at night to see if this will help with sleep.    Please continue to follow-up with cardiology on a regular basis regarding the atrial fibrillation.    Labs have been reviewed with you today.

## 2024-09-24 ENCOUNTER — APPOINTMENT (OUTPATIENT)
Dept: SLEEP MEDICINE | Facility: CLINIC | Age: 69
End: 2024-09-24
Payer: MEDICARE

## 2024-10-01 ENCOUNTER — APPOINTMENT (OUTPATIENT)
Dept: PHYSICAL THERAPY | Facility: CLINIC | Age: 69
End: 2024-10-01
Payer: MEDICARE

## 2024-10-15 ENCOUNTER — APPOINTMENT (OUTPATIENT)
Dept: PHYSICAL THERAPY | Facility: CLINIC | Age: 69
End: 2024-10-15
Payer: MEDICARE

## 2024-11-01 ENCOUNTER — OFFICE VISIT (OUTPATIENT)
Dept: PRIMARY CARE | Facility: CLINIC | Age: 69
End: 2024-11-01
Payer: MEDICARE

## 2024-11-01 ENCOUNTER — HOSPITAL ENCOUNTER (OUTPATIENT)
Dept: RADIOLOGY | Facility: CLINIC | Age: 69
Discharge: HOME | End: 2024-11-01
Payer: MEDICARE

## 2024-11-01 VITALS
TEMPERATURE: 97.8 F | HEART RATE: 67 BPM | BODY MASS INDEX: 30.57 KG/M2 | WEIGHT: 210 LBS | OXYGEN SATURATION: 94 % | SYSTOLIC BLOOD PRESSURE: 135 MMHG | DIASTOLIC BLOOD PRESSURE: 75 MMHG

## 2024-11-01 DIAGNOSIS — J22 LOWER RESPIRATORY INFECTION: ICD-10-CM

## 2024-11-01 DIAGNOSIS — J22 LOWER RESPIRATORY INFECTION: Primary | ICD-10-CM

## 2024-11-01 LAB
POC BINAX EXPIRATION: NORMAL
POC BINAX NOW COVID SERIAL NUMBER: NORMAL
POC RAPID INFLUENZA A: NEGATIVE
POC RAPID INFLUENZA B: NEGATIVE
POC SARS-COV-2 AG BINAX: NORMAL

## 2024-11-01 PROCEDURE — 87804 INFLUENZA ASSAY W/OPTIC: CPT | Performed by: NURSE PRACTITIONER

## 2024-11-01 PROCEDURE — 71046 X-RAY EXAM CHEST 2 VIEWS: CPT

## 2024-11-01 PROCEDURE — 99214 OFFICE O/P EST MOD 30 MIN: CPT | Performed by: NURSE PRACTITIONER

## 2024-11-01 PROCEDURE — 1123F ACP DISCUSS/DSCN MKR DOCD: CPT | Performed by: NURSE PRACTITIONER

## 2024-11-01 PROCEDURE — 3078F DIAST BP <80 MM HG: CPT | Performed by: NURSE PRACTITIONER

## 2024-11-01 PROCEDURE — 1036F TOBACCO NON-USER: CPT | Performed by: NURSE PRACTITIONER

## 2024-11-01 PROCEDURE — 3075F SYST BP GE 130 - 139MM HG: CPT | Performed by: NURSE PRACTITIONER

## 2024-11-01 PROCEDURE — 1159F MED LIST DOCD IN RCRD: CPT | Performed by: NURSE PRACTITIONER

## 2024-11-01 PROCEDURE — 87811 SARS-COV-2 COVID19 W/OPTIC: CPT | Performed by: NURSE PRACTITIONER

## 2024-11-01 RX ORDER — PREDNISONE 20 MG/1
40 TABLET ORAL DAILY
Qty: 10 TABLET | Refills: 0 | Status: SHIPPED | OUTPATIENT
Start: 2024-11-01 | End: 2024-11-06

## 2024-11-01 ASSESSMENT — COLUMBIA-SUICIDE SEVERITY RATING SCALE - C-SSRS
2. HAVE YOU ACTUALLY HAD ANY THOUGHTS OF KILLING YOURSELF?: NO
6. HAVE YOU EVER DONE ANYTHING, STARTED TO DO ANYTHING, OR PREPARED TO DO ANYTHING TO END YOUR LIFE?: NO
1. IN THE PAST MONTH, HAVE YOU WISHED YOU WERE DEAD OR WISHED YOU COULD GO TO SLEEP AND NOT WAKE UP?: NO

## 2024-11-01 ASSESSMENT — ENCOUNTER SYMPTOMS
LOSS OF SENSATION IN FEET: 0
DEPRESSION: 0
OCCASIONAL FEELINGS OF UNSTEADINESS: 0

## 2024-11-01 ASSESSMENT — PATIENT HEALTH QUESTIONNAIRE - PHQ9
1. LITTLE INTEREST OR PLEASURE IN DOING THINGS: NOT AT ALL
SUM OF ALL RESPONSES TO PHQ9 QUESTIONS 1 AND 2: 0
2. FEELING DOWN, DEPRESSED OR HOPELESS: NOT AT ALL
10. IF YOU CHECKED OFF ANY PROBLEMS, HOW DIFFICULT HAVE THESE PROBLEMS MADE IT FOR YOU TO DO YOUR WORK, TAKE CARE OF THINGS AT HOME, OR GET ALONG WITH OTHER PEOPLE: NOT DIFFICULT AT ALL

## 2024-11-04 ENCOUNTER — PATIENT MESSAGE (OUTPATIENT)
Dept: PRIMARY CARE | Facility: CLINIC | Age: 69
End: 2024-11-04
Payer: MEDICARE

## 2024-11-05 NOTE — PATIENT COMMUNICATION
Called [facility] at [phone] & they said they would send a message to the radiologist & ask them to read the xray STAT.

## 2025-02-24 ENCOUNTER — PATIENT OUTREACH (OUTPATIENT)
Dept: PRIMARY CARE | Facility: CLINIC | Age: 70
End: 2025-02-24
Payer: MEDICARE

## 2025-02-24 RX ORDER — OSELTAMIVIR PHOSPHATE 75 MG/1
75 CAPSULE ORAL 2 TIMES DAILY
COMMUNITY
Start: 2025-02-21 | End: 2025-02-24

## 2025-02-24 NOTE — PROGRESS NOTES
Discharge Facility:[facility]  Discharge Diagnosis:Syncope, Influenza A  Admission Date:2/18/25  Discharge Date: 2/22/25    PCP Appointment Date:Message sent to office  Specialist Appointment Date: Ablation 2/27/25  Hospital Encounter and Summary Linked: Yes  See discharge assessment below for further details    Wrap Up  Wrap Up Additional Comments: Patient is doing about the same as discharge. He has his discharge medication. He is scheduled for Ablation 2/27/25. Message sent to pcp office for follow up. No questions or concerns at this time. (2/24/2025 10:58 AM)    Medications  Medications reviewed with patient/caregiver?: Yes (2/24/2025 10:58 AM)  Is the patient having any side effects they believe may be caused by any medication additions or changes?: No (2/24/2025 10:58 AM)  Does the patient have all medications ordered at discharge?: Yes (2/24/2025 10:58 AM)  Care Management Interventions: Provided patient education (2/24/2025 10:58 AM)  Prescription Comments: Tamiflu (2/24/2025 10:58 AM)  Is the patient taking all medications as directed (includes completed medication regime)?: Yes (2/24/2025 10:58 AM)  Care Management Interventions: Provided patient education (2/24/2025 10:58 AM)  Medication Comments: Patient has his discharge medication (2/24/2025 10:58 AM)    Appointments  Does the patient have a primary care provider?: Yes (2/24/2025 10:58 AM)  Care Management Interventions: Advised patient to make appointment (2/24/2025 10:58 AM)  Has the patient kept scheduled appointments due by today?: Yes (2/24/2025 10:58 AM)    Self Management  Has home health visited the patient within 72 hours of discharge?: Not applicable (2/24/2025 10:58 AM)  What Durable Medical Equipment (DME) was ordered?: N/A (2/24/2025 10:58 AM)    Patient Teaching  Does the patient have access to their discharge instructions?: Yes (2/24/2025 10:58 AM)  Care Management Interventions: Reviewed instructions with patient (2/24/2025 10:58  AM)  What is the patient's perception of their health status since discharge?: Same (2/24/2025 10:58 AM)  Is the patient/caregiver able to teach back the hierarchy of who to call/visit for symptoms/problems? PCP, Specialist, Home Health nurse, Urgent Care, ED, 911: Yes (2/24/2025 10:58 AM)

## 2025-02-26 ENCOUNTER — TELEPHONE (OUTPATIENT)
Dept: PRIMARY CARE | Facility: CLINIC | Age: 70
End: 2025-02-26
Payer: MEDICARE

## 2025-03-06 ENCOUNTER — OFFICE VISIT (OUTPATIENT)
Dept: PRIMARY CARE | Facility: CLINIC | Age: 70
End: 2025-03-06
Payer: MEDICARE

## 2025-03-06 VITALS
BODY MASS INDEX: 31.59 KG/M2 | DIASTOLIC BLOOD PRESSURE: 78 MMHG | HEART RATE: 68 BPM | WEIGHT: 217 LBS | OXYGEN SATURATION: 98 % | TEMPERATURE: 97.4 F | SYSTOLIC BLOOD PRESSURE: 134 MMHG

## 2025-03-06 DIAGNOSIS — J10.1 INFLUENZA A: ICD-10-CM

## 2025-03-06 DIAGNOSIS — Z09 HOSPITAL DISCHARGE FOLLOW-UP: Primary | ICD-10-CM

## 2025-03-06 DIAGNOSIS — R55 SYNCOPE, UNSPECIFIED SYNCOPE TYPE: ICD-10-CM

## 2025-03-06 PROCEDURE — 3078F DIAST BP <80 MM HG: CPT | Performed by: NURSE PRACTITIONER

## 2025-03-06 PROCEDURE — 1123F ACP DISCUSS/DSCN MKR DOCD: CPT | Performed by: NURSE PRACTITIONER

## 2025-03-06 PROCEDURE — 1036F TOBACCO NON-USER: CPT | Performed by: NURSE PRACTITIONER

## 2025-03-06 PROCEDURE — 1159F MED LIST DOCD IN RCRD: CPT | Performed by: NURSE PRACTITIONER

## 2025-03-06 PROCEDURE — 99495 TRANSJ CARE MGMT MOD F2F 14D: CPT | Performed by: NURSE PRACTITIONER

## 2025-03-06 PROCEDURE — 1160F RVW MEDS BY RX/DR IN RCRD: CPT | Performed by: NURSE PRACTITIONER

## 2025-03-06 PROCEDURE — 3075F SYST BP GE 130 - 139MM HG: CPT | Performed by: NURSE PRACTITIONER

## 2025-03-06 ASSESSMENT — PATIENT HEALTH QUESTIONNAIRE - PHQ9
10. IF YOU CHECKED OFF ANY PROBLEMS, HOW DIFFICULT HAVE THESE PROBLEMS MADE IT FOR YOU TO DO YOUR WORK, TAKE CARE OF THINGS AT HOME, OR GET ALONG WITH OTHER PEOPLE: NOT DIFFICULT AT ALL
2. FEELING DOWN, DEPRESSED OR HOPELESS: NOT AT ALL
1. LITTLE INTEREST OR PLEASURE IN DOING THINGS: NOT AT ALL
SUM OF ALL RESPONSES TO PHQ9 QUESTIONS 1 AND 2: 0

## 2025-03-06 NOTE — PROGRESS NOTES
Subjective   Patient ID: Herman Merino is a 70 y.o. male who presents for [facility] Follow up. .    HPI     Patient was contacted by Transitional Care Management services two days after discharge. This encounter and supporting documentation was reviewed.     Patient was admitted to [facility] from 2/19-2/22 after he presented to the ED with complaints of syncopal event witnessed by his wife. He did have +LOC. He had some nausea, and vomiting over the few days prior. In the ED he was hypotensive and tachycardic. Received IV fluids. Suspected to have syncopal episode secondary to influenza A. He was treated with IV fluids and tamiflu.     He is feeling much better. Still feeling a little weak but improving. Denies fevers, chills, body aches, lightheadedness, dizziness, chest pain.     He is scheduled for his cardiac ablation for afib on 3/12.      Review of Systems  ROS negative except as noted above in HPI.     Objective   /78 (BP Location: Left arm, Patient Position: Sitting)   Pulse 68   Temp 36.3 °C (97.4 °F)   Wt 98.4 kg (217 lb)   SpO2 98%   BMI 31.59 kg/m²     Physical Exam  General: Alert and oriented, in no acute distress. Appears stated age, well-nourished, and well hydrated  HEENT:  - Head: Normocephalic and atraumatic   - Eyes: EOMI, PERRLA  - ENT: Hearing grossly intact. Mucus membranes pink and moist without lesions. Tonsils present without swelling or exudates. Good dentition. TMs gray  Neck: Supple. No stiffness. No thyromegaly or thyroid nodules  Heart: RRR. No murmurs, clicks, or rubs  Lungs: Unlabored breathing. CTAB with no crackles, wheezes, or rhonchi  Abdomen: Normal BS in all 4 quadrants. Soft, non-tender, non-distended, with no masses  Extremities: Warm and well perfused. No edema. Normal peripheral pulses  Musculoskeletal: ROM intact. Strength 5/5 in BUE and BLE. No joint swelling. Normal gait and station  Neurological: Alert and oriented. No gross  neurological deficits. Normal sensation. No weakness. DTRs +2/4   Psychological: Appropriate mood and affect  Skin: No rash, abnormal lesions, cyanosis, or erythema     Assessment/Plan   Diagnoses and all orders for this visit:  Hospital discharge follow-up  Syncope, unspecified syncope type  Influenza A  - Feeling better, just having some weakness  - Tamiflu finished    Scheduled for cardiac ablation for afib on 3/12    The complexity of medical decision making for this patient's transitional care is moderate.     Heather Moncada, BRIGETTE-CNP  [facility]

## 2025-03-11 ENCOUNTER — PATIENT OUTREACH (OUTPATIENT)
Dept: PRIMARY CARE | Facility: CLINIC | Age: 70
End: 2025-03-11
Payer: MEDICARE

## 2025-03-11 NOTE — PROGRESS NOTES
Call regarding appt. with PCP on 3/6/25 after hospitalization. At time of outreach call the patient feels as if their condition has improved since last visit. Patient will be having an Ablation tomorrow 3/12/25. Reviewed the PCP appointment with the pt and addressed any questions or concerns.

## 2025-04-11 ENCOUNTER — PATIENT OUTREACH (OUTPATIENT)
Dept: PRIMARY CARE | Facility: CLINIC | Age: 70
End: 2025-04-11
Payer: MEDICARE

## 2025-05-14 ENCOUNTER — PATIENT OUTREACH (OUTPATIENT)
Dept: PRIMARY CARE | Facility: CLINIC | Age: 70
End: 2025-05-14
Payer: MEDICARE

## 2025-05-14 NOTE — PROGRESS NOTES
Final call. Called and spoke with patient's wife to address any questions or concerns regarding hospitalization.   Patient's wife reports their condition has returned to baseline.   Patient's wife encouraged to keep my contact information in case any needs arise.

## (undated) DEVICE — HOLSTER, ELECTROSURGERY ACCESSORY, STERILE

## (undated) DEVICE — APPLICATOR, CHLORAPREP, W/ORANGE TINT, 26ML

## (undated) DEVICE — Device

## (undated) DEVICE — RETRIEVAL SYSTEM, MONARCH, 10MM DISP ENDOSCOPIC

## (undated) DEVICE — SLEEVE, KII, Z-THREAD, 5X100CM

## (undated) DEVICE — STRAP, SECURE, 5MM, SINGLE, ABSORABLE, LF

## (undated) DEVICE — DRESSING, TRANSPARENT, TEGADERM, 2-3/8 X 2-3/4 IN

## (undated) DEVICE — BINDER, ABDOMINAL, 3 PANEL, 9 X 30-45 IN, SM/MED

## (undated) DEVICE — NEEDLE, SPINAL, 22 G X 3.5 IN, BLACK HUB

## (undated) DEVICE — LIGASURE, V SEALER/DIVIDER  5MM BLUNT TIP

## (undated) DEVICE — BANDAGE, FLEX-WRAP, 3 IN X 5 YD, TAN, NON-STERILE

## (undated) DEVICE — PASSER, SUTURE, W/ 10/12MM SUTURE GUIDE

## (undated) DEVICE — ELECTRODE, LAPAROSCOPIC, SPATULA, 5MM X 32CM

## (undated) DEVICE — GRASPER TIP, LAPCLINCH, DISP

## (undated) DEVICE — SLEEVE, VASO PRESS, CALF GARMENT, MEDIUM, GREEN

## (undated) DEVICE — TROCAR, KII OPTICAL BLADELESS 5MM Z THREAD 100MM LNGTH

## (undated) DEVICE — TROCAR, OPTICAL, BLADELESS, 12MM, THREADED, 100MM LENGTH

## (undated) DEVICE — CARE KIT, LAPAROSCOPIC, ADVANCED

## (undated) DEVICE — STRIP, SKIN CLOSURE, STERI STRIP, REINFORCED, 0.5 X 4 IN

## (undated) DEVICE — DRESSING, GAUZE, SPONGE, 8 PLY, CURITY, 2 X 2 IN, STERILE